# Patient Record
Sex: MALE | Race: WHITE | NOT HISPANIC OR LATINO | Employment: FULL TIME | URBAN - NONMETROPOLITAN AREA
[De-identification: names, ages, dates, MRNs, and addresses within clinical notes are randomized per-mention and may not be internally consistent; named-entity substitution may affect disease eponyms.]

---

## 2021-05-12 ENCOUNTER — APPOINTMENT (OUTPATIENT)
Dept: CT IMAGING | Facility: HOSPITAL | Age: 36
End: 2021-05-12

## 2021-05-12 ENCOUNTER — HOSPITAL ENCOUNTER (EMERGENCY)
Facility: HOSPITAL | Age: 36
Discharge: HOME | End: 2021-05-12
Attending: EMERGENCY MEDICINE | Admitting: EMERGENCY MEDICINE

## 2021-05-12 VITALS
SYSTOLIC BLOOD PRESSURE: 150 MMHG | OXYGEN SATURATION: 98 % | TEMPERATURE: 98 F | HEIGHT: 75 IN | DIASTOLIC BLOOD PRESSURE: 86 MMHG | WEIGHT: 212.7 LBS | RESPIRATION RATE: 16 BRPM | BODY MASS INDEX: 26.45 KG/M2 | HEART RATE: 97 BPM

## 2021-05-12 DIAGNOSIS — R10.9 ABDOMINAL PAIN, UNSPECIFIED ABDOMINAL LOCATION: ICD-10-CM

## 2021-05-12 DIAGNOSIS — K52.9 GASTROENTERITIS: Primary | ICD-10-CM

## 2021-05-12 LAB
ALBUMIN SERPL-MCNC: 4 G/DL (ref 3.5–5.2)
ALBUMIN/GLOB SERPL: 1.1 G/DL
ALP SERPL-CCNC: 197 U/L (ref 39–117)
ALT SERPL W P-5'-P-CCNC: 92 U/L (ref 1–41)
ANION GAP SERPL CALCULATED.3IONS-SCNC: 10 MMOL/L (ref 5–15)
AST SERPL-CCNC: 69 U/L (ref 1–40)
BASOPHILS # BLD AUTO: 0.06 10*3/MM3 (ref 0–0.2)
BASOPHILS NFR BLD AUTO: 0.7 % (ref 0–1.5)
BILIRUB SERPL-MCNC: 0.2 MG/DL (ref 0–1.2)
BILIRUB UR QL STRIP: NEGATIVE
BUN SERPL-MCNC: 9 MG/DL (ref 6–20)
BUN/CREAT SERPL: 10.3 (ref 7–25)
CALCIUM SPEC-SCNC: 9.6 MG/DL (ref 8.6–10.5)
CHLORIDE SERPL-SCNC: 102 MMOL/L (ref 98–107)
CLARITY UR: CLEAR
CO2 SERPL-SCNC: 29 MMOL/L (ref 22–29)
COLOR UR: ABNORMAL
CREAT SERPL-MCNC: 0.87 MG/DL (ref 0.76–1.27)
DEPRECATED RDW RBC AUTO: 36.4 FL (ref 37–54)
EOSINOPHIL # BLD AUTO: 0.05 10*3/MM3 (ref 0–0.4)
EOSINOPHIL NFR BLD AUTO: 0.6 % (ref 0.3–6.2)
ERYTHROCYTE [DISTWIDTH] IN BLOOD BY AUTOMATED COUNT: 11.3 % (ref 12.3–15.4)
GFR SERPL CREATININE-BSD FRML MDRD: 100 ML/MIN/1.73
GLOBULIN UR ELPH-MCNC: 3.6 GM/DL
GLUCOSE SERPL-MCNC: 103 MG/DL (ref 65–99)
GLUCOSE UR STRIP-MCNC: NEGATIVE MG/DL
HCT VFR BLD AUTO: 39.4 % (ref 37.5–51)
HGB BLD-MCNC: 14 G/DL (ref 13–17.7)
HGB UR QL STRIP.AUTO: NEGATIVE
HOLD SPECIMEN: NORMAL
HOLD SPECIMEN: NORMAL
IMM GRANULOCYTES # BLD AUTO: 0.08 10*3/MM3 (ref 0–0.05)
IMM GRANULOCYTES NFR BLD AUTO: 0.9 % (ref 0–0.5)
KETONES UR QL STRIP: ABNORMAL
LEUKOCYTE ESTERASE UR QL STRIP.AUTO: NEGATIVE
LIPASE SERPL-CCNC: 24 U/L (ref 13–60)
LYMPHOCYTES # BLD AUTO: 2.51 10*3/MM3 (ref 0.7–3.1)
LYMPHOCYTES NFR BLD AUTO: 28.7 % (ref 19.6–45.3)
MCH RBC QN AUTO: 31.5 PG (ref 26.6–33)
MCHC RBC AUTO-ENTMCNC: 35.5 G/DL (ref 31.5–35.7)
MCV RBC AUTO: 88.7 FL (ref 79–97)
MONOCYTES # BLD AUTO: 0.95 10*3/MM3 (ref 0.1–0.9)
MONOCYTES NFR BLD AUTO: 10.9 % (ref 5–12)
NEUTROPHILS NFR BLD AUTO: 5.1 10*3/MM3 (ref 1.7–7)
NEUTROPHILS NFR BLD AUTO: 58.2 % (ref 42.7–76)
NITRITE UR QL STRIP: NEGATIVE
NRBC BLD AUTO-RTO: 0 /100 WBC (ref 0–0.2)
PH UR STRIP.AUTO: 5.5 [PH] (ref 5–9)
PLAT MORPH BLD: NORMAL
PLATELET # BLD AUTO: 384 10*3/MM3 (ref 140–450)
PMV BLD AUTO: 9 FL (ref 6–12)
POTASSIUM SERPL-SCNC: 3.7 MMOL/L (ref 3.5–5.2)
PROT SERPL-MCNC: 7.6 G/DL (ref 6–8.5)
PROT UR QL STRIP: NEGATIVE
RBC # BLD AUTO: 4.44 10*6/MM3 (ref 4.14–5.8)
RBC MORPH BLD: NORMAL
SODIUM SERPL-SCNC: 141 MMOL/L (ref 136–145)
SP GR UR STRIP: 1.03 (ref 1–1.03)
TROPONIN T SERPL-MCNC: <0.01 NG/ML (ref 0–0.03)
UROBILINOGEN UR QL STRIP: ABNORMAL
WBC # BLD AUTO: 8.75 10*3/MM3 (ref 3.4–10.8)
WBC MORPH BLD: NORMAL
WHOLE BLOOD HOLD SPECIMEN: NORMAL
WHOLE BLOOD HOLD SPECIMEN: NORMAL

## 2021-05-12 PROCEDURE — 25010000002 ONDANSETRON PER 1 MG: Performed by: EMERGENCY MEDICINE

## 2021-05-12 PROCEDURE — 99283 EMERGENCY DEPT VISIT LOW MDM: CPT

## 2021-05-12 PROCEDURE — 81003 URINALYSIS AUTO W/O SCOPE: CPT | Performed by: EMERGENCY MEDICINE

## 2021-05-12 PROCEDURE — 84484 ASSAY OF TROPONIN QUANT: CPT | Performed by: EMERGENCY MEDICINE

## 2021-05-12 PROCEDURE — 25010000002 MORPHINE PER 10 MG: Performed by: EMERGENCY MEDICINE

## 2021-05-12 PROCEDURE — 96375 TX/PRO/DX INJ NEW DRUG ADDON: CPT

## 2021-05-12 PROCEDURE — 85007 BL SMEAR W/DIFF WBC COUNT: CPT | Performed by: EMERGENCY MEDICINE

## 2021-05-12 PROCEDURE — 80053 COMPREHEN METABOLIC PANEL: CPT | Performed by: EMERGENCY MEDICINE

## 2021-05-12 PROCEDURE — 83690 ASSAY OF LIPASE: CPT | Performed by: EMERGENCY MEDICINE

## 2021-05-12 PROCEDURE — 74177 CT ABD & PELVIS W/CONTRAST: CPT

## 2021-05-12 PROCEDURE — 85025 COMPLETE CBC W/AUTO DIFF WBC: CPT | Performed by: EMERGENCY MEDICINE

## 2021-05-12 PROCEDURE — 25010000002 IOPAMIDOL 61 % SOLUTION: Performed by: EMERGENCY MEDICINE

## 2021-05-12 PROCEDURE — 96374 THER/PROPH/DIAG INJ IV PUSH: CPT

## 2021-05-12 RX ORDER — DICYCLOMINE HCL 20 MG
20 TABLET ORAL EVERY 6 HOURS PRN
Qty: 10 TABLET | Refills: 0 | Status: SHIPPED | OUTPATIENT
Start: 2021-05-12

## 2021-05-12 RX ORDER — ONDANSETRON 2 MG/ML
4 INJECTION INTRAMUSCULAR; INTRAVENOUS ONCE
Status: COMPLETED | OUTPATIENT
Start: 2021-05-12 | End: 2021-05-12

## 2021-05-12 RX ORDER — SODIUM CHLORIDE 0.9 % (FLUSH) 0.9 %
10 SYRINGE (ML) INJECTION AS NEEDED
Status: DISCONTINUED | OUTPATIENT
Start: 2021-05-12 | End: 2021-05-12 | Stop reason: HOSPADM

## 2021-05-12 RX ORDER — DICYCLOMINE HCL 20 MG
20 TABLET ORAL EVERY 6 HOURS PRN
Qty: 10 TABLET | Refills: 0 | Status: SHIPPED | OUTPATIENT
Start: 2021-05-12 | End: 2021-05-12 | Stop reason: SDUPTHER

## 2021-05-12 RX ADMIN — ONDANSETRON HYDROCHLORIDE 4 MG: 2 INJECTION, SOLUTION INTRAMUSCULAR; INTRAVENOUS at 16:03

## 2021-05-12 RX ADMIN — MORPHINE SULFATE 4 MG: 4 INJECTION INTRAVENOUS at 16:03

## 2021-05-12 RX ADMIN — IOPAMIDOL 90 ML: 612 INJECTION, SOLUTION INTRAVENOUS at 16:34

## 2021-05-12 RX ADMIN — SODIUM CHLORIDE 1000 ML: 900 INJECTION, SOLUTION INTRAVENOUS at 16:02

## 2022-12-17 ENCOUNTER — HOSPITAL ENCOUNTER (INPATIENT)
Age: 37
LOS: 4 days | Discharge: HOME | DRG: 897 | End: 2022-12-22
Attending: EMERGENCY MEDICINE | Admitting: PSYCHIATRY & NEUROLOGY
Payer: COMMERCIAL

## 2022-12-17 ENCOUNTER — APPOINTMENT (OUTPATIENT)
Dept: CT IMAGING | Age: 37
DRG: 897 | End: 2022-12-17
Payer: COMMERCIAL

## 2022-12-17 DIAGNOSIS — F29 PSYCHOSIS, UNSPECIFIED PSYCHOSIS TYPE (HCC): ICD-10-CM

## 2022-12-17 DIAGNOSIS — R44.3 HALLUCINATIONS: Primary | ICD-10-CM

## 2022-12-17 LAB — SARS-COV-2, NAAT: NOT DETECTED

## 2022-12-17 PROCEDURE — 99285 EMERGENCY DEPT VISIT HI MDM: CPT

## 2022-12-17 PROCEDURE — 96372 THER/PROPH/DIAG INJ SC/IM: CPT

## 2022-12-17 PROCEDURE — 70450 CT HEAD/BRAIN W/O DYE: CPT

## 2022-12-17 ASSESSMENT — ENCOUNTER SYMPTOMS
VOMITING: 0
DIARRHEA: 0
SHORTNESS OF BREATH: 0
EYE PAIN: 0
ABDOMINAL PAIN: 0

## 2022-12-18 PROBLEM — R44.3 HALLUCINATIONS: Status: ACTIVE | Noted: 2022-12-18

## 2022-12-18 PROBLEM — F29 PSYCHOSIS, UNSPECIFIED PSYCHOSIS TYPE (HCC): Status: ACTIVE | Noted: 2022-12-18

## 2022-12-18 LAB
ACETAMINOPHEN LEVEL: <5 UG/ML (ref 10–30)
ALBUMIN SERPL-MCNC: 4.5 G/DL (ref 3.5–5.2)
ALP BLD-CCNC: 77 U/L (ref 40–130)
ALT SERPL-CCNC: 13 U/L (ref 5–41)
AMPHETAMINE SCREEN, URINE: NEGATIVE
ANION GAP SERPL CALCULATED.3IONS-SCNC: 12 MMOL/L (ref 7–19)
AST SERPL-CCNC: 18 U/L (ref 5–40)
BARBITURATE SCREEN URINE: NEGATIVE
BENZODIAZEPINE SCREEN, URINE: NEGATIVE
BILIRUB SERPL-MCNC: 0.4 MG/DL (ref 0.2–1.2)
BILIRUBIN URINE: NEGATIVE
BLOOD, URINE: NEGATIVE
BUN BLDV-MCNC: 12 MG/DL (ref 6–20)
BUPRENORPHINE URINE: NEGATIVE
CALCIUM SERPL-MCNC: 9.4 MG/DL (ref 8.6–10)
CANNABINOID SCREEN URINE: NEGATIVE
CHLORIDE BLD-SCNC: 100 MMOL/L (ref 98–111)
CLARITY: CLEAR
CO2: 25 MMOL/L (ref 22–29)
COCAINE METABOLITE SCREEN URINE: NEGATIVE
COLOR: YELLOW
CREAT SERPL-MCNC: 1.1 MG/DL (ref 0.5–1.2)
ETHANOL: <10 MG/DL (ref 0–0.08)
GFR SERPL CREATININE-BSD FRML MDRD: >60 ML/MIN/{1.73_M2}
GLUCOSE BLD-MCNC: 104 MG/DL (ref 74–109)
GLUCOSE URINE: NEGATIVE MG/DL
HCT VFR BLD CALC: 44 % (ref 42–52)
HEMOGLOBIN: 14.5 G/DL (ref 14–18)
KETONES, URINE: NEGATIVE MG/DL
LEUKOCYTE ESTERASE, URINE: NEGATIVE
Lab: NORMAL
MCH RBC QN AUTO: 31.9 PG (ref 27–31)
MCHC RBC AUTO-ENTMCNC: 33 G/DL (ref 33–37)
MCV RBC AUTO: 96.9 FL (ref 80–94)
METHADONE SCREEN, URINE: NEGATIVE
METHAMPHETAMINE, URINE: NEGATIVE
NITRITE, URINE: NEGATIVE
OPIATE SCREEN URINE: NEGATIVE
OXYCODONE URINE: NEGATIVE
PDW BLD-RTO: 13.2 % (ref 11.5–14.5)
PH UA: 7.5 (ref 5–8)
PHENCYCLIDINE SCREEN URINE: NEGATIVE
PLATELET # BLD: 346 K/UL (ref 130–400)
PMV BLD AUTO: 9.4 FL (ref 9.4–12.4)
POTASSIUM SERPL-SCNC: 4.3 MMOL/L (ref 3.5–5)
PROPOXYPHENE SCREEN: NEGATIVE
PROTEIN UA: NEGATIVE MG/DL
RBC # BLD: 4.54 M/UL (ref 4.7–6.1)
SALICYLATE, SERUM: <0.3 MG/DL (ref 3–10)
SODIUM BLD-SCNC: 137 MMOL/L (ref 136–145)
SPECIFIC GRAVITY UA: 1.01 (ref 1–1.03)
TOTAL PROTEIN: 7.2 G/DL (ref 6.6–8.7)
TRICYCLIC, URINE: NEGATIVE
UROBILINOGEN, URINE: 0.2 E.U./DL
WBC # BLD: 7.3 K/UL (ref 4.8–10.8)

## 2022-12-18 PROCEDURE — 81003 URINALYSIS AUTO W/O SCOPE: CPT

## 2022-12-18 PROCEDURE — 80306 DRUG TEST PRSMV INSTRMNT: CPT

## 2022-12-18 PROCEDURE — 1240000000 HC EMOTIONAL WELLNESS R&B

## 2022-12-18 PROCEDURE — 80053 COMPREHEN METABOLIC PANEL: CPT

## 2022-12-18 PROCEDURE — 6360000002 HC RX W HCPCS: Performed by: EMERGENCY MEDICINE

## 2022-12-18 PROCEDURE — 87635 SARS-COV-2 COVID-19 AMP PRB: CPT

## 2022-12-18 PROCEDURE — 80179 DRUG ASSAY SALICYLATE: CPT

## 2022-12-18 PROCEDURE — 80143 DRUG ASSAY ACETAMINOPHEN: CPT

## 2022-12-18 PROCEDURE — 36415 COLL VENOUS BLD VENIPUNCTURE: CPT

## 2022-12-18 PROCEDURE — 6370000000 HC RX 637 (ALT 250 FOR IP): Performed by: PSYCHIATRY & NEUROLOGY

## 2022-12-18 PROCEDURE — 85027 COMPLETE CBC AUTOMATED: CPT

## 2022-12-18 PROCEDURE — 82077 ASSAY SPEC XCP UR&BREATH IA: CPT

## 2022-12-18 RX ORDER — POLYETHYLENE GLYCOL 3350 17 G/17G
17 POWDER, FOR SOLUTION ORAL DAILY PRN
Status: DISCONTINUED | OUTPATIENT
Start: 2022-12-18 | End: 2022-12-22 | Stop reason: HOSPADM

## 2022-12-18 RX ORDER — RISPERIDONE 1 MG/1
1 TABLET ORAL NIGHTLY
Status: DISCONTINUED | OUTPATIENT
Start: 2022-12-18 | End: 2022-12-19

## 2022-12-18 RX ORDER — HYDROXYZINE HYDROCHLORIDE 25 MG/1
25 TABLET, FILM COATED ORAL 3 TIMES DAILY PRN
Status: DISCONTINUED | OUTPATIENT
Start: 2022-12-18 | End: 2022-12-22 | Stop reason: HOSPADM

## 2022-12-18 RX ORDER — LORAZEPAM 2 MG/ML
2 INJECTION INTRAMUSCULAR ONCE
Status: COMPLETED | OUTPATIENT
Start: 2022-12-18 | End: 2022-12-18

## 2022-12-18 RX ORDER — ACETAMINOPHEN 325 MG/1
650 TABLET ORAL EVERY 4 HOURS PRN
Status: DISCONTINUED | OUTPATIENT
Start: 2022-12-18 | End: 2022-12-22 | Stop reason: HOSPADM

## 2022-12-18 RX ORDER — TRAZODONE HYDROCHLORIDE 50 MG/1
50 TABLET ORAL NIGHTLY
Status: DISCONTINUED | OUTPATIENT
Start: 2022-12-18 | End: 2022-12-22 | Stop reason: HOSPADM

## 2022-12-18 RX ORDER — HALOPERIDOL 5 MG/ML
5 INJECTION INTRAMUSCULAR ONCE
Status: COMPLETED | OUTPATIENT
Start: 2022-12-18 | End: 2022-12-18

## 2022-12-18 RX ORDER — MECOBALAMIN 5000 MCG
5 TABLET,DISINTEGRATING ORAL NIGHTLY
Status: DISCONTINUED | OUTPATIENT
Start: 2022-12-18 | End: 2022-12-22 | Stop reason: HOSPADM

## 2022-12-18 RX ADMIN — LORAZEPAM 2 MG: 2 INJECTION INTRAMUSCULAR; INTRAVENOUS at 02:57

## 2022-12-18 RX ADMIN — TRAZODONE HYDROCHLORIDE 50 MG: 50 TABLET ORAL at 20:52

## 2022-12-18 RX ADMIN — HALOPERIDOL LACTATE 5 MG: 5 INJECTION, SOLUTION INTRAMUSCULAR at 02:57

## 2022-12-18 RX ADMIN — RISPERIDONE 1 MG: 1 TABLET ORAL at 20:52

## 2022-12-18 RX ADMIN — Medication 5 MG: at 20:52

## 2022-12-18 ASSESSMENT — SLEEP AND FATIGUE QUESTIONNAIRES
DO YOU HAVE DIFFICULTY SLEEPING: YES
DO YOU USE A SLEEP AID: NO
AVERAGE NUMBER OF SLEEP HOURS: 4

## 2022-12-18 ASSESSMENT — ENCOUNTER SYMPTOMS
GASTROINTESTINAL NEGATIVE: 1
RESPIRATORY NEGATIVE: 1

## 2022-12-18 NOTE — H&P
(TYLENOL) tablet 650 mg, 650 mg, Oral, Q4H PRN, Jonnathan Ferreira MD    polyethylene glycol (GLYCOLAX) packet 17 g, 17 g, Oral, Daily PRN, Jonnathan Ferreira MD    traZODone (DESYREL) tablet 50 mg, 50 mg, Oral, Nightly, Jonnathan Ferreira MD    melatonin disintegrating tablet 5 mg, 5 mg, Oral, Nightly, Jonnathan Ferreira MD    hydrOXYzine HCl (ATARAX) tablet 25 mg, 25 mg, Oral, TID PRN, Jonnathan Ferreira MD    risperiDONE (RISPERDAL) tablet 1 mg, 1 mg, Oral, Nightly, Jonnathan Ferreira MD    nicotine polacrilex (NICORETTE) gum 2 mg, 2 mg, Oral, Q2H PRN, Jonnathan Ferreira MD    Patient has no known allergies. REVIEW OF SYSTEMS:    Review of Systems   Constitutional: Negative. HENT: Negative. Respiratory: Negative. Cardiovascular: Negative. Gastrointestinal: Negative. Genitourinary: Negative. Musculoskeletal: Negative. Skin: Negative. Neurological: Negative. Psychiatric/Behavioral:  Positive for hallucinations. PHYSICAL EXAM:    /80   Pulse (!) 101   Temp 97.3 °F (36.3 °C)   Resp 16   Ht 6' 3\" (1.905 m)   Wt 193 lb 8 oz (87.8 kg)   SpO2 97%   BMI 24.19 kg/m²     Physical Exam  Vitals reviewed. Constitutional:       Appearance: Normal appearance. HENT:      Head: Normocephalic and atraumatic. Mouth/Throat:      Mouth: Mucous membranes are moist.      Pharynx: Oropharynx is clear. Eyes:      Extraocular Movements: Extraocular movements intact. Pupils: Pupils are equal, round, and reactive to light. Cardiovascular:      Rate and Rhythm: Normal rate and regular rhythm. Pulses: Normal pulses. Heart sounds: Normal heart sounds. Pulmonary:      Effort: Pulmonary effort is normal. No respiratory distress. Breath sounds: Normal breath sounds. Abdominal:      General: Abdomen is flat. Bowel sounds are normal.      Palpations: Abdomen is soft. Musculoskeletal:         General: Normal range of motion. Cervical back: Normal range of motion and neck supple. Skin:     General: Skin is warm and dry. Capillary Refill: Capillary refill takes less than 2 seconds. Neurological:      General: No focal deficit present. Mental Status: He is alert and oriented to person, place, and time. Cranial Nerves: No cranial nerve deficit. Psychiatric:         Mood and Affect: Mood normal.         Behavior: Behavior normal.         Thought Content:  Thought content normal.         Judgment: Judgment normal.       Recent Results (from the past 24 hour(s))   COVID-19, Rapid    Collection Time: 12/17/22 11:08 PM    Specimen: Nasopharyngeal Swab   Result Value Ref Range    SARS-CoV-2, NAAT Not Detected Not Detected   Acetaminophen Level    Collection Time: 12/17/22 11:52 PM   Result Value Ref Range    Acetaminophen Level <5 (L) 10 - 30 ug/mL   CBC    Collection Time: 12/17/22 11:52 PM   Result Value Ref Range    WBC 7.3 4.8 - 10.8 K/uL    RBC 4.54 (L) 4.70 - 6.10 M/uL    Hemoglobin 14.5 14.0 - 18.0 g/dL    Hematocrit 44.0 42.0 - 52.0 %    MCV 96.9 (H) 80.0 - 94.0 fL    MCH 31.9 (H) 27.0 - 31.0 pg    MCHC 33.0 33.0 - 37.0 g/dL    RDW 13.2 11.5 - 14.5 %    Platelets 550 674 - 836 K/uL    MPV 9.4 9.4 - 12.4 fL   Comprehensive Metabolic Panel    Collection Time: 12/17/22 11:52 PM   Result Value Ref Range    Sodium 137 136 - 145 mmol/L    Potassium 4.3 3.5 - 5.0 mmol/L    Chloride 100 98 - 111 mmol/L    CO2 25 22 - 29 mmol/L    Anion Gap 12 7 - 19 mmol/L    Glucose 104 74 - 109 mg/dL    BUN 12 6 - 20 mg/dL    Creatinine 1.1 0.5 - 1.2 mg/dL    Est, Glom Filt Rate >60 >60    Calcium 9.4 8.6 - 10.0 mg/dL    Total Protein 7.2 6.6 - 8.7 g/dL    Albumin 4.5 3.5 - 5.2 g/dL    Total Bilirubin 0.4 0.2 - 1.2 mg/dL    Alkaline Phosphatase 77 40 - 130 U/L    ALT 13 5 - 41 U/L    AST 18 5 - 40 U/L   Ethanol    Collection Time: 12/17/22 11:52 PM   Result Value Ref Range    Ethanol Lvl <41 mg/dL   Salicylate    Collection Time: 12/17/22 11:52 PM   Result Value Ref Range    Salicylate, Serum <0.3 (L) 3.0 - 10.0 mg/dL   Drug SCRN, Buprenorphine    Collection Time: 12/18/22 12:34 AM   Result Value Ref Range    Amphetamine Screen, Urine Negative Negative <500 ng/mL    Barbiturate Screen, Ur Negative Negative < 200 ng/mL    Benzodiazepine Screen, Urine Negative Negative <150 ng/mL    Cannabinoid Scrn, Ur Negative Negative <50 ng/mL    Cocaine Metabolite Screen, Urine Negative Negative <150 ng/mL    Opiate Scrn, Ur Negative Negative < 100 ng/mL    PCP Screen, Urine Negative Negative <25 ng/mL    Methadone Screen, Urine Negative Negative <200 ng/mL    Propoxyphene Scrn, Ur Negative Negative <266 ng/mL    Tricyclic Negative Negative <300 ng/mL    Oxycodone Urine Negative Negative <100 ng/mL    Buprenorphine Urine Negative Negative <10 ng/mL    Methamphetamine, Urine Negative Negative <500 ng/mL    Drug Screen Comment: see below    Urinalysis with Reflex to Culture    Collection Time: 12/18/22 12:34 AM    Specimen: Urine   Result Value Ref Range    Color, UA YELLOW Straw/Yellow    Clarity, UA Clear Clear    Glucose, Ur Negative Negative mg/dL    Bilirubin Urine Negative Negative    Ketones, Urine Negative Negative mg/dL    Specific Gravity, UA 1.013 1.005 - 1.030    Blood, Urine Negative Negative    pH, UA 7.5 5.0 - 8.0    Protein, UA Negative Negative mg/dL    Urobilinogen, Urine 0.2 <2.0 E.U./dL    Nitrite, Urine Negative Negative    Leukocyte Esterase, Urine Negative Negative         ASSESSMENT:  Hallucinations  Psychosis    PLAN:    He is admitted to [facility]. Lab results reviewed. No home meds listed. VSS. Continue plan of care per psych. Alter treatment plan as needed.         Alexys Mathew, APRN,   12/18/2022

## 2022-12-18 NOTE — PROGRESS NOTES
Group Note    Date: 12/18/22  Start Time: 9:05 AM   End Time:9:45 AM     Number of Participants: 11    Type of Group: Community/Goal     Patient's Goal:      Notes:  Did not participate    Status After Intervention:      Participation Level:     Participation Quality:     Speech:       Thought Process/Content:     Mood:     Level of consciousness:      Response to Learning:     Modes of Intervention: Education and Support    Discipline Responsible: Behavioral Health Technician     Signature:  Edgardo Hinton

## 2022-12-18 NOTE — PROGRESS NOTES
Admission Note      Reason for admission/Target Symptom: Per nursing admission assessment - Reason for Admission: Richa Garcia is a 30 y.o. male who presents to the emergency department for a mental health evaluation. Patient has been seeing ghosts for 2 weeks. Says that sometimes he will see something on the corner of his eyes like a dark shadow. Sometimes he sees stuff on the bed that makes the sheets move. No active hallucinations at this time. Felt like a ghost bit him on his right ear and right arm earlier. He has no signs of trauma. No HI or SI. He has no complaints at this time. Family did say that his wife recently had an EPO put out against him because of strange behavior. Family said that wife told him that she has been tracking him with his phone and that he has not really slept in the last 4 days and has been going to multiple gas stations. She is concerned that he may be using kratom. Family says that wife states that she was with him when he bought it but family thinks she may be unreliable. Does have a history of meth use in October of this year. Also has a history of alcohol use. Patient denies any meth use since October. Denies any ingestion or drug use of any kind. Patient has no inpatient or outpatient psychiatric treatment and no history of suicide attempts. Diagnoses: Psychosis, unspecified; Methamphetamine Use Disorder; Alcohol Use Disorder    UDS: Negative   BAL: Negative <10    SW will meet with treatment team to discuss patient's treatment including care planning, discharge planning, and follow-up needs. Patient has been admitted to [facility]. Treatment team will identify the patient's discharge needs. Appointments will be made for medication management and outpatient therapy/counseling. Pt confirmed the need for ongoing treatment post inpatient stay.  Pt was also provided a handout of contact information for drug and alcohol treatment centers and other community support service such as [facility], [facility], and [facility].

## 2022-12-18 NOTE — PLAN OF CARE
Problem: Self Harm/Suicidality  Goal: Will have no self-injury during hospital stay  Description: INTERVENTIONS:  1. Ensure constant observer at bedside with Q15M safety checks  2. Maintain a safe environment  3. Secure patient belongings  4. Ensure family/visitors adhere to safety recommendations  5. Ensure safety tray has been added to patient's diet order  6.   Every shift and PRN: Re-assess suicidal risk via Frequent Screener    Outcome: Progressing  Flowsheets (Taken 12/18/2022 1419)  Will have no self-injury during hospital stay: Maintain a safe environment

## 2022-12-18 NOTE — ED PROVIDER NOTES
[facility] EMERGENCY DEPT  eMERGENCY dEPARTMENT eNCOUnter      Pt Name: Abby Gomez  MRN: [id number]  [place] 1985  Date of evaluation: 12/17/2022  Provider: Abby Mora MD    CHIEF COMPLAINT       Chief Complaint   Patient presents with    Mental Health Problem     Pt states that he has been \"seeing ghosts\"- states that one of them bit him today- states has been seeing them for about a week         HISTORY OF PRESENT ILLNESS   (Location/Symptom, Timing/Onset,Context/Setting, Quality, Duration, Modifying Factors, Severity)  Note limiting factors. Abby Gomez is a 30 y.o. male who presents to the emergency department relation. Patient has been seeing ghosts for over 2 weeks now. Says that sometimes he will see something on the corner of his eyes like a dark shadow. Sometimes he sees stuff on the bed that makes the sheets moved. No active hallucinations at this time. Felt like a ghost bit him on his right ear and right arm earlier. He has no signs of trauma. No HI or SI. He has no complaints at this time. Accompanied by family. Family did tell me that his wife recently had an EPO put out against him because of strange behavior. Family said that wife told him that she has been tracking him with his phone and that he has not really slept in the last 4 days has been going to multiple gas stations. She is concerned that he may be using kratom. Does have a history of meth use in October of this year. Also has a history of alcohol use. Patient denies any meth use since October. Denies any ingestion or drug use of any kind. Currently, patient resting comfortably without complaints. HPI    NursingNotes were reviewed. REVIEW OF SYSTEMS    (2-9 systems for level 4, 10 or more for level 5)     Review of Systems   Constitutional:  Negative for fever. Eyes:  Negative for pain. Respiratory:  Negative for shortness of breath. Cardiovascular:  Negative for chest pain and palpitations. Gastrointestinal:  Negative for abdominal pain, diarrhea and vomiting. Genitourinary:  Negative for dysuria. Skin:  Negative for rash. Neurological:  Negative for weakness and headaches. Psychiatric/Behavioral:  Positive for hallucinations. Negative for suicidal ideas. All other systems reviewed and are negative. A complete review of systems was performed and is negative except as noted above in the HPI. PAST MEDICAL HISTORY   History reviewed. No pertinent past medical history. SURGICAL HISTORY     History reviewed. No pertinent surgical history. CURRENT MEDICATIONS       Previous Medications    No medications on file       ALLERGIES     Patient has no known allergies. FAMILY HISTORY     History reviewed. No pertinent family history. SOCIAL HISTORY       Social History     Socioeconomic History    Marital status:      Spouse name: None    Number of children: None    Years of education: None    Highest education level: None   Tobacco Use    Smoking status: Every Day     Packs/day: 0.50     Types: Cigarettes   Substance and Sexual Activity    Alcohol use: Yes     Comment: 2 x weekly    Drug use: Not Currently     Types: Methamphetamines (Crystal Meth)       SCREENINGS    Oden Coma Scale  Eye Opening: Spontaneous  Best Verbal Response: Oriented  Best Motor Response: Obeys commands  Oden Coma Scale Score: 15        PHYSICAL EXAM    (up to 7 for level 4, 8 or more for level 5)     ED Triage Vitals [12/17/22 2254]   BP Temp Temp src Heart Rate Resp SpO2 Height Weight   (!) 146/106 97.8 °F (36.6 °C) -- (!) 110 17 95 % 6' 3\" (1.905 m) 210 lb (95.3 kg)       Physical Exam  Vitals reviewed. Constitutional:       General: He is not in acute distress. Appearance: He is well-developed. HENT:      Head: Normocephalic and atraumatic.       Ears:      Comments: Bilateral cerumen impaction     Mouth/Throat:      Mouth: Mucous membranes are moist.   Eyes:      General: No scleral icterus. Extraocular Movements: Extraocular movements intact. Conjunctiva/sclera: Conjunctivae normal.      Pupils: Pupils are equal, round, and reactive to light. Neck:      Vascular: No JVD. Cardiovascular:      Rate and Rhythm: Normal rate and regular rhythm. Pulses: Normal pulses. Heart sounds: Normal heart sounds. Pulmonary:      Effort: Pulmonary effort is normal. No respiratory distress. Breath sounds: Normal breath sounds. Abdominal:      General: There is no distension. Palpations: Abdomen is soft. Tenderness: There is no abdominal tenderness. There is no guarding or rebound. Musculoskeletal:         General: No tenderness. Normal range of motion. Cervical back: Normal range of motion and neck supple. No rigidity. Lymphadenopathy:      Cervical: No cervical adenopathy. Skin:     General: Skin is warm and dry. Capillary Refill: Capillary refill takes less than 2 seconds. Neurological:      General: No focal deficit present. Mental Status: He is alert and oriented to person, place, and time. GCS: GCS eye subscore is 4. GCS verbal subscore is 5. GCS motor subscore is 6. Cranial Nerves: Cranial nerves 2-12 are intact. Sensory: Sensation is intact. Motor: Motor function is intact. Psychiatric:         Mood and Affect: Mood and affect normal.         Speech: Speech normal.         Behavior: Behavior normal.         Thought Content:  Thought content normal.       DIAGNOSTIC RESULTS     EKG: All EKG's are interpreted by the Emergency Department Physician who either signs or Co-signs this chart in the absence of a cardiologist.        RADIOLOGY:   Non-plain film images such as CT, Ultrasound and MRI are read by the radiologist. Plainradiographic images are visualized and preliminarily interpreted by the emergency physician with the below findings:        Interpretation per the Radiologist below, if available at the time of this note:    CT HEAD WO CONTRAST   Final Result   No acute hemorrhage, hydrocephalus, or mass effect. Electronically signed by Stacey Brizuela MD on 12-18-22 at 3552            ED BEDSIDE ULTRASOUND:   Performed by ED Physician - none    LABS:  Labs Reviewed   ACETAMINOPHEN LEVEL - Abnormal; Notable for the following components:       Result Value    Acetaminophen Level <5 (*)     All other components within normal limits   CBC - Abnormal; Notable for the following components:    RBC 4.54 (*)     MCV 96.9 (*)     MCH 31.9 (*)     All other components within normal limits   SALICYLATE LEVEL - Abnormal; Notable for the following components:    Salicylate, Serum <8.3 (*)     All other components within normal limits   COVID-19, RAPID   COMPREHENSIVE METABOLIC PANEL   ETHANOL   DRUG SCRN, BUPRENORPHINE   URINALYSIS WITH REFLEX TO CULTURE       All other labs were within normal range or not returned as of this dictation. EMERGENCY DEPARTMENT COURSE and DIFFERENTIALDIAGNOSIS/MDM:   Vitals:    Vitals:    12/18/22 0032 12/18/22 0102 12/18/22 0132 12/18/22 0155   BP: (!) 139/91 125/73 124/69    Pulse:       Resp:       Temp:       SpO2: 95% 93%  97%   Weight:       Height:           MDM    Patient was seen by intake with psychiatry and discussed with on-call psychiatrist, Dr. Hannah Vyas, who will admit. Patient has been placed on a 72-hour hold. Haldol and Ativan ordered per Dr. April Recinos instructions. CONSULTS:  IP CONSULT TO PSYCHIATRY    PROCEDURES:  Unless otherwise notedbelow, none     Procedures    FINAL IMPRESSION     1. Hallucinations    2.  Psychosis, unspecified psychosis type [facility])          DISPOSITION/PLAN   DISPOSITION Decision To Admit 12/18/2022 02:41:45 AM      PATIENT REFERRED TO:  @FUP@    DISCHARGE MEDICATIONS:  New Prescriptions    No medications on file          (Please note that portions of this note were completed with a voice recognition program.  Efforts were made to edit the dictations butoccasionally words are mis-transcribed.)    Luz Nunez MD (electronically signed)  AttendingEmergency Physician          Luz Nunez MD  12/18/22 4676
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
## 2022-12-18 NOTE — H&P
Department of Psychiatry  Attending History and Physical - Adult         CHIEF COMPLAINT: Psychosis    History obtained from:  patient    HISTORY OF PRESENT ILLNESS:          The patient is a 30 y.o. male with no reported previous psychiatric history, who has been admitted to our psychiatric unit secondary to visual hallucinations. Patient has been seen in treatment team room. He reported that during the last 1-1/2-week he sees ghosts \"only when I go inside of my house\". He reported long history of abusing alcohol, using methamphetamine, opioids and marijuana. Patient reported that he went to rehab in September - October 2022 and since that time he significantly decreased amount of drinking alcohol and using methamphetamine. He reported that last time when he used opioids (kratom) is in September 2022 and last time he smoked marijuana 1 year ago. (For additional information, see patient's social history). Patient reported that experiencing visual hallucinations makes him feel stressed and he decided to come to the hospital and ask for help. Today during the interview, he denies feeling of depression or anxiety, endorses decreased quality of sleep, low energy level, feeling of fatigue, tiredness, and general muscle weakness. Patient denies any mood swings or racing thoughts. He denies feeling of hopelessness, helplessness and worthlessness. Patient denies current active suicidal or homicidal ideations, denies any plans. Also, he denies auditory and visual hallucinations today, stated that last time when he experienced visual hallucinations is yesterday in the morning. He did not endorse any delusions or paranoid thoughts. PSYCHIATRIC HISTORY:    Diagnoses: Denies  Suicide attempts/gestures: Denies   Prior hospitalizations: Denies   Medication trials: Denies   Mental health contact: Denies  Head trauma: Denies     Past Medical History:    History reviewed.  No pertinent past medical history. Past Surgical History:    History reviewed. No pertinent surgical history. Medications Prior to Admission:   No medications prior to admission. Allergies:  Patient has no known allergies. Social History:  Patient lives with his wife and 1 his child. Tobacco-1 PPD and vapes nicotine cartridges  Alcohol-reported history of heavy drinking alcohol, stated that he was drinking at least 6 - 8 packs of the beer daily for 10-15 years. Patient reported that he decreased the amount of drinking alcohol after recent rehab treatment and currently drinks alcohol only 6 packs a week. Illicit substances, patient reported that he smoked marijuana in the past daily and last smoked marijuana 1 year ago. Methamphetamine - has been using \"for a month almost every day\" in August - September. Patient reported that after he finished rehab treatment in October he used methamphetamine twice during the last 2 months, last time 2 weeks ago. Kratom-reported that he used kratom for insomnia, last time used kratom in September 2022. The patient reported history of admission to rehab facility for alcohol or drug disorder in September - October 2022. Family History:   History reviewed. No pertinent family history. Psychiatric Family History  No family history    REVIEW OF SYSTEMS:  General: Endorses decreased quality of sleep. No fevers, chills, night sweats, no recent weight loss or gain. Head: No headache, no migraine. Eyes: No recent visual changes. Ears: No recent hearing changes. Nose: No increased congestion or change in sense of smell. Throat: No sore throat, no trouble swallowing. Cardiovascular: No chest pain or palpitations, or dizziness. Respiratory: No cough, wheezes, congestion, or shortness of breath. Gastrointestinal: No abdominal pain, nausea or vomiting, no diarrhea or constipation. Musculo-skeletal: No edema, deformities, or loss of functions.   Neurological: No loss of consciousness, abnormal sensations, or weakness. Skin: No rash, abrasions or bruises. PHYSICAL EXAM:    Vitals:  /85   Pulse (!) 112   Temp 97 °F (36.1 °C) (Temporal)   Resp 20   Ht 6' 3\" (1.905 m)   Wt 193 lb 8 oz (87.8 kg)   SpO2 97%   BMI 24.19 kg/m²     Mental Status Examination:   Appearance: Appropriately groomed and in hospital attire. Made intermittent eye contact. Behavior: Slightly withdrawn, cooperative with interview. Mild psychomotor retardation appreciated. Gait unremarkable. Speech: Normal in tone, volume, and quality. Mood: \"Very tired\"   Affect: Mood congruent. Range is flat and restricted. Thought Process: Mostly circumstantial, sometimes linear and goal oriented. Thought Content: Patient does not have any current active suicidal and homicidal ideations. No overt delusions or paranoia appreciated. Perceptions: Seems patient does not have any auditory or visual hallucinations at present time. Patient did not appear to be responding to internal stimuli. No overt psychosis. Executive Functions: Appear mildly impaired. Concentration: Decreased. Reasoning: Appears impaired based on interaction from interview   Orientation: to person, place, date, and situation. Alert. Language: Intact. Fund of information: Intact. Memory: recent and remote appear intact. Impulsivity: Limited  Neurovegitative: Fair appetite, decreased sleep  Insight: Limited  Judgment: Limited       Physical Exam:  GENERAL APPEARANCE: 30y.o. year-old male in NAD   HEAD: Normocephalic, atraumatic. THROAT: No erythema, exudates, lesions. No tongue laceration. CARDIOVASCULAR: PMI nondisplaced. Regular rhythm and rate. Normal S1 and S2.  PULMONARY: Clear to auscultation bilaterally, no tenderness to palpation. ABDOMEN: Soft, nontender, nondistended.    MUSCULOSKELTAL: No obvious deformities, clubbing, cyanosis or edema, no spinous process or paraspinous tenderness, normal ROM, normal gait, distal pulses intact symmetric 2+ bilaterally. NEUROLOGICAL: Alert, oriented x 4, CN II-XII grossly intact, motor strength 4/5 all muscle groups, DTR 1+ intact and symmetric, sensation intact to sharp and dull. No abnormal movements or tremors. SKIN: Warm, dry, intact, no rash, abrasions or bruises     DATA:  Labs:  CBC with Differential:    Lab Results   Component Value Date/Time    WBC 7.3 12/17/2022 11:52 PM    RBC 4.54 12/17/2022 11:52 PM    HGB 14.5 12/17/2022 11:52 PM    HCT 44.0 12/17/2022 11:52 PM     12/17/2022 11:52 PM    MCV 96.9 12/17/2022 11:52 PM    MCH 31.9 12/17/2022 11:52 PM    MCHC 33.0 12/17/2022 11:52 PM    RDW 13.2 12/17/2022 11:52 PM     CMP:    Lab Results   Component Value Date/Time     12/17/2022 11:52 PM    K 4.3 12/17/2022 11:52 PM     12/17/2022 11:52 PM    CO2 25 12/17/2022 11:52 PM    BUN 12 12/17/2022 11:52 PM    CREATININE 1.1 12/17/2022 11:52 PM    LABGLOM >60 12/17/2022 11:52 PM    GLUCOSE 104 12/17/2022 11:52 PM    PROT 7.2 12/17/2022 11:52 PM    LABALBU 4.5 12/17/2022 11:52 PM    CALCIUM 9.4 12/17/2022 11:52 PM    BILITOT 0.4 12/17/2022 11:52 PM    ALKPHOS 77 12/17/2022 11:52 PM    AST 18 12/17/2022 11:52 PM    ALT 13 12/17/2022 11:52 PM       DSM 5 DIAGNOSIS:  Psychosis, unspecified  Methamphetamine use disorder, severe  Opioids (kratom) use disorder, severe  Cannabis use disorder, severe  Alcohol use disorder, severe  Tobacco use disorder, severe  Rule out drug-induced psychosis  Insomnia    Plan:   -Admit to [facility] adult Unit and monitor on 15 minute checks  Latrell Bolivar reviewed. -Gather collateral information from family with release  -Medical monitoring to be performed by Dr. Omi Castillo and associates  -Acclimate to the unit. -Encourage participation in groups and therapeutic activities as appropriate.  -Medications:     Will start patient on Risperdal 1 mg at bedtime for psychosis  Trazodone 50 mg and melatonin 5 mg at bedtime for insomnia  Nicotine gum 2 mg every 2 hours as needed for smoking cessation    -The risks, benefits, side effects, indications, contraindications, and adverse effects of the medications have been discussed.  -The patient has verbalized understanding and has capacity to give informed consent.  -SW help evaluating home environment.   -Discuss with treatment team.

## 2022-12-18 NOTE — PROGRESS NOTES
[facility] Admission Note  Nursing Admission Note        Reason for Admission: Brent Kelsey is a 30 y.o. male who presents to the emergency department for a mental health evaluation. Patient has been seeing ghosts for 2 weeks. Says that sometimes he will see something on the corner of his eyes like a dark shadow. Sometimes he sees stuff on the bed that makes the sheets move. No active hallucinations at this time. Felt like a ghost bit him on his right ear and right arm earlier. He has no signs of trauma. No HI or SI. He has no complaints at this time. Family did say that his wife recently had an EPO put out against him because of strange behavior. Family said that wife told him that she has been tracking him with his phone and that he has not really slept in the last 4 days and has been going to multiple gas stations. She is concerned that he may be using kratom. Family says that wife states that she was with him when he bought it but family thinks she may be unreliable. Does have a history of meth use in October of this year. Also has a history of alcohol use. Patient denies any meth use since October. Denies any ingestion or drug use of any kind. Patient has no inpatient or outpatient psychiatric treatment and no history of suicide attempts. Patient Active Problem List   Diagnosis    Psychosis, unspecified psychosis type ([address]         Addictive Behavior:    none    Medical Problems:   History reviewed. No pertinent past medical history.     Status EXAM:  Mental Status and Behavioral Exam  Normal: No  Level of Assistance: Independent/Self  Facial Expression: Avoids Gaze  Affect: Congruent  Level of Consciousness: Alert  Frequency of Checks: 4 times per hour, close  Mood:Normal: No  Mood: Depressed  Motor Activity:Normal: No  Motor Activity: Decreased, Agitated, Tics  Eye Contact: Poor  Observed Behavior: Cooperative  Sexual Misconduct History: Current - no  Preception: [place] to time, [place] to person, Niesha Caper to place, [place] to situation  Attention:Normal: Yes  Thought Processes: Tangential  Thought Content:Normal: No  Thought Content: Delusions  Depression Symptoms: Sleep disturbance, Isolative, Increased irritability, Impaired concentration  Anxiety Symptoms: Generalized, Panic attack, Compulsive, Obsessions, Ritualistic behavior  Jennie Symptoms: No problems reported or observed. Hallucinations: Auditory (comment), Visual (comment) (Pt reports seeing ghost and hearing high squeaking noises)  Delusions: Yes  Delusions: Controlled, Obsessions  Memory:Normal: No  Memory: Poor recent, Poor remote  Insight and Judgment: No  Insight and Judgment: Poor judgment, Poor insight, Unrealistic    Psych:   Suicidal Ideation: no.  If yes, is there a plan? (Describe) NA              Risk of Suicide: No Risk   Homicidal Ideation:  no.  If yes, describe: NA   Auditory/Visual Hallucinations:  yes. If yes, describe AVH? Patient sees ghosts like shadows or smoke and hearing them making a high pitched squealing sound and hearing them thud when they hit the walls or doors. Metabolic Screening:  No results found for: LABA1C  No results found for: CHOL  No results found for: TRIG  No results found for: HDL  No components found for: LDLCAL  No results found for: LABVLDL  Body mass index is 24.19 kg/m².   BP Readings from Last 2 Encounters:   12/18/22 118/85       PATIENT STRENGTHS and Barriers:   Family s      Tobacco Screening:  Practical Counseling, on admission, jem X, if applicable and completed (first 3 are required if patient doesn't refuse):            Recognizing danger situations (included triggers and roadblocks)   NA              Coping skills (new ways to manage stress, exercise, relaxation techniques, changing routine, distraction  NA                                                    Basic information about quitting (benefits of quitting, techniques in how to quit, available resources NA  Referral for counseling faxed to [facility]     NA                                       Patient refused counseling yes  Patient has not smoked in the last 30 days NA  Patient offered nicotine patch. Received offered  Refused yes  Patient is a non-smoker NA       Admission to Unit:    Pt admitted to [facility] under the care of Dr. Ricardo Bowens MD,  arrived on unit via [facility] with security and staff from ED    Patient arrived dressed in paper scrubs:  yes. Body assessment and safety check completed by Deondre Worthy RN and Marti Ahn RN and  yes contraband discovered. Patient belongings and valuables was cataloged and accounted for by Nir Officer, RN  . Admission completed by LOUISE Landeros RN  Oriented to unit, unit policy and expectations:  yes    Reviewed and explained all legal documents:  yes    Education for [place] and Restraints given: yes    Patient signed all legal documents no   Pt verbalizes understanding:no     Enrrique Asper Obtained? yes    Medical Bed:  Does patient require a medical bed? no  If answered yes for medical bed use, does the patient have the following conditions? High risk for falls? no   Obstructive sleep apnea? yes   Oxygen Use? no   Use of assistive devices? no   Other, (explain)? NA      Identifies stressors. yes   Drugs, Alcohol, and relationship with wife. Protective Factors:  Patient identifies protective factors with nursing staff as follows: Identifies reasons for living: Yes   Supportive Social Network or family: Yes    Belief that suicide is immoral/high spirituality: No   Responsibility to family or others/living with family: No   Fear of death or dying due to pain and suffering: No   Engaged in work or school: Yes     If Patient is unable to identify, reason why? NA          Admission Note:    Patient is upset that he has to stay. He received Haldol and Ativan in the ED before transferring to the unit from the ED. Patient is calm and cooperative with staff.   After patient vitals and search, patient retires to his room and no complaints are noted for the rest of the shift.           Electronically signed by Slim Whitney RN on 12/18/22 at 5:19 AM CST

## 2022-12-18 NOTE — ED NOTES
Pt changed into purple scrubs; belongings removed from room and given to security. Sitter at bedside.      Eden Dumont RN  12/17/22 [id number]

## 2022-12-18 NOTE — PROGRESS NOTES
[facility] Adult Unit Daily Assessment  Nursing Progress Note    Room: [address] [place] [address]   Name: Linda Scott   Age: 30 y.o. Gender: male   Dx: Psychosis, unspecified psychosis type ([address]  Precautions: suicide risk  Inpatient Status: involuntary       SLEEP:  Sleep Quality Good  Sleep Medications: No   PRN Sleep Meds: No       MEDICAL:  Other PRN Meds: No   Med Compliant: Yes  Accu-Chek: No  Oxygen/CPAP/BiPAP: No  CIWA/CINA: No   PAIN Assessment: none  Side Effects from medication: No      Metabolic Screening:  No results found for: LABA1C  No results found for: CHOL  No results found for: TRIG  No results found for: HDL  No components found for: LDLCAL  No results found for: LABVLDL  Body mass index is 24.19 kg/m². BP Readings from Last 2 Encounters:   12/18/22 137/80         Medical Bed:   Is patient in a medical bed? no   If medical bed is in use, has nursing secured room while patient is awake and out of the room? no  Has safety checks by nursing been completed on the bed/room this shift? no    Protective Factors:  Patient identifies protective factors with nursing staff as follows: Identifies reasons for living: Yes   Supportive Social Network or family: Yes    Belief that suicide is immoral/high spirituality: Yes   Responsibility to family or others/living with family: Yes   Fear of death or dying due to pain and suffering: Yes   Engaged in work or school: Yes     If Patient is unable to identify, reason why? PSYCH:  Depression: 7   Anxiety: 0   SI denies suicidal ideation   Risk of Suicide: No Risk  HI Negative for homicidal ideation      AVH:no If Hallucinations are present, describe? GENERAL:  Appetite: good   Percent Meals: 75%   Social: No   Speech: normal   Appearance: appropriately dressed and healthy looking    GROUP:  Group Participation: No  Participation Quality: None    Notes: Patient alert and oriented times 4. Patient has been in room most of the day resting and sleeping.   Patient rates depression 7/10, anxiety 0/10, denies SI, HI, and AVH. He denies having any hallucinations today. Patient interacts well with staff with fair eye contact. Patient reports he is sleepy and would like to sleep. Encouraged patient to participate with activities. Will continue to monitor.          Electronically signed by Candance Copping, RN on 12/18/22 at 2:35 PM CST

## 2022-12-18 NOTE — PROGRESS NOTES
SW met with patient to complete psychosocial and lifetime CSSR-S on this date. Patients long and short-term goals discussed. Patient voiced understanding. Treatment plan sheet signed. Patient verbalized understanding of the treatment plan. Patient participated in goals and objectives of the treatment plan. Patient discussed safety plan with . In the last 6 months has the patient been a danger to self: No  In the last 6 months has the patient been a danger to others: No  Legal Guardian/POA: No    Provided patient with [employer] handout entitled \"Quitting Smoking. \"  Reviewed handout with patient: addressing dangers of smoking, developing coping skills, and providing basic information about quitting. Patient received all components practical counseling of tobacco practical counseling during the hospital stay.

## 2022-12-19 PROBLEM — F15.21 METHAMPHETAMINE USE DISORDER, SEVERE, IN EARLY REMISSION (HCC): Status: ACTIVE | Noted: 2022-12-19

## 2022-12-19 PROBLEM — F10.21 ALCOHOL USE DISORDER, SEVERE, IN EARLY REMISSION (HCC): Status: ACTIVE | Noted: 2022-12-19

## 2022-12-19 PROBLEM — F12.21 CANNABIS USE DISORDER, MODERATE, IN EARLY REMISSION (HCC): Status: ACTIVE | Noted: 2022-12-19

## 2022-12-19 PROCEDURE — 1240000000 HC EMOTIONAL WELLNESS R&B

## 2022-12-19 PROCEDURE — 6370000000 HC RX 637 (ALT 250 FOR IP): Performed by: NURSE PRACTITIONER

## 2022-12-19 PROCEDURE — 6370000000 HC RX 637 (ALT 250 FOR IP): Performed by: PSYCHIATRY & NEUROLOGY

## 2022-12-19 RX ORDER — RISPERIDONE 1 MG/1
1 TABLET, ORALLY DISINTEGRATING ORAL ONCE
Status: COMPLETED | OUTPATIENT
Start: 2022-12-19 | End: 2022-12-19

## 2022-12-19 RX ORDER — RISPERIDONE 1 MG/1
2 TABLET ORAL NIGHTLY
Status: DISCONTINUED | OUTPATIENT
Start: 2022-12-19 | End: 2022-12-22 | Stop reason: HOSPADM

## 2022-12-19 RX ADMIN — HYDROXYZINE HYDROCHLORIDE 25 MG: 25 TABLET, FILM COATED ORAL at 20:32

## 2022-12-19 RX ADMIN — Medication 5 MG: at 20:33

## 2022-12-19 RX ADMIN — RISPERIDONE 1 MG: 1 TABLET, ORALLY DISINTEGRATING ORAL at 09:59

## 2022-12-19 RX ADMIN — HYDROXYZINE HYDROCHLORIDE 25 MG: 25 TABLET, FILM COATED ORAL at 09:59

## 2022-12-19 RX ADMIN — ACETAMINOPHEN 650 MG: 325 TABLET ORAL at 23:11

## 2022-12-19 RX ADMIN — RISPERIDONE 2 MG: 1 TABLET ORAL at 20:32

## 2022-12-19 RX ADMIN — TRAZODONE HYDROCHLORIDE 50 MG: 50 TABLET ORAL at 20:32

## 2022-12-19 ASSESSMENT — PAIN DESCRIPTION - DESCRIPTORS: DESCRIPTORS: ACHING;DISCOMFORT

## 2022-12-19 ASSESSMENT — PAIN - FUNCTIONAL ASSESSMENT: PAIN_FUNCTIONAL_ASSESSMENT: ACTIVITIES ARE NOT PREVENTED

## 2022-12-19 ASSESSMENT — PAIN DESCRIPTION - ORIENTATION: ORIENTATION: RIGHT

## 2022-12-19 ASSESSMENT — PAIN SCALES - GENERAL
PAINLEVEL_OUTOF10: 3
PAINLEVEL_OUTOF10: 5

## 2022-12-19 ASSESSMENT — PAIN DESCRIPTION - LOCATION: LOCATION: SHOULDER

## 2022-12-19 NOTE — PROGRESS NOTES
Group Note    Date: 12/19/22  Start Time: 8:00 AM   End Time:8:45 AM     Number of Participants: 13    Type of Group: Community/Goal     Patient's Goal:  \"Don't know\"    Notes:      Status After Intervention:      Participation Level:  Active Listener    Participation Quality: Appropriate    Speech:  normal    Thought Process/Content: Logical    Mood:  Calm    Level of consciousness:  Alert    Response to Learning: Able to verbalize current knowledge/experience    Modes of Intervention: Education and Support    Discipline Responsible: Behavioral Health Technician     Signature:  Nawaf Ortiz
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
## 2022-12-19 NOTE — PROGRESS NOTES
Patient is in room sitting on side of bed crying. Slightly irritable. Reports his anxiety his high and says I need to go home today because he has to get back to work. Atarax and risperdal M tab administered to patient at this time. Will continue to monitor.

## 2022-12-19 NOTE — PROGRESS NOTES
[facility] Adult Unit Daily Assessment  Nursing Progress Note     Room: [facility]   Name: Zarina Huddleston   Age: 30 y.o. Gender: male   Dx: Psychosis, unspecified psychosis type ([address]  Precautions: suicide risk  Inpatient Status: involuntary         SLEEP:  Sleep Quality Good          MEDICAL:  Other PRN Meds: No   Med Compliant: Yes  Accu-Chek: No  Oxygen/CPAP/BiPAP: No  CIWA/CINA: No   PAIN Assessment: none  Side Effects from medication: No                Medical Bed:   Is patient in a medical bed? no   If medical bed is in use, has nursing secured room while patient is awake and out of the room? no  Has safety checks by nursing been completed on the bed/room this shift? no     Protective Factors:  Patient identifies protective factors with nursing staff as follows: Identifies reasons for living: Yes              Supportive Social Network or family: Yes               Belief that suicide is immoral/high spirituality: Yes              Responsibility to family or others/living with family: Yes              Fear of death or dying due to pain and suffering: Yes              Engaged in work or school: Yes                If Patient is unable to identify, reason why? PSYCH:  Depression: 4  Anxiety: 10  SI denies suicidal ideation   Risk of Suicide: No Risk  HI Negative for homicidal ideation      AVH:no If Hallucinations are present, describe? GENERAL:  Appetite: good   Percent Meals: 100%   Social: No   Speech: normal   Appearance: appropriately dressed and healthy looking     GROUP:  Group Participation: No  Participation Quality: None    Notes: Patient is observed in room sitting on side of bed crying. Reports he is upset he is not able to go home today because he needs to get back to work. Wearing casual attire. Appropriately dressed and well groomed. He is alert and oriented x 4. Pleasant, calm, and cooperative. Reports good sleep lastnight and good appetite.  He is compliant with medications. Performs ADLs. He has been isolated to room, not social and not attending groups. Cooperative during interview with poor eye contact during interview with fair concentration. Judgement and insight are poor. Thought processes are circumstantial. Withdrawn, affect is restricted. Denies suicidal ideation, homicidal ideation, and hallucinations. No evidence of delusions or paranoia at this encounter.     Electronically signed by Marixa Partida RN on 12/19/22 at 10:10 AM CST

## 2022-12-19 NOTE — PLAN OF CARE
Group Therapy Note    Date: 12/19/2022  Start Time: 1000  End Time:  1030  Number of Participants: 10    Type of Group: Psychoeducation    Wellness Binder Information  Module Name:  Relapse Prevention  Session Number:  5    Group Goal for Pt: To improve knowledge of relapse prevention strategies    Notes:  Pt did not attend group discussion. Pt was invited/encouraged. Status After Intervention:      Participation Level:     Participation Quality:       Speech:         Thought Process/Content:       Affective Functioning:       Mood:       Level of consciousness:        Response to Learning:       Endings:     Modes of Intervention:       Discipline Responsible:       Signature:  Dakotah Finley

## 2022-12-19 NOTE — PLAN OF CARE
Problem: Psychosis  Goal: Will report no hallucinations or delusions  12/19/2022 1608 by Velvet Cruz    Group Therapy Note     Date: 12/19/2022  Start Time: 7905  End Time:  1600  Number of Participants: 8     Type of Group: Recovery     Wellness Binder Information  Module Name:  Emotional wellness  Session Number:  1     Patient's Goal:  validation of feelings     Notes:  pt acknowledged to have feelings validated It may be necessary to share feelings with others.      Status After Intervention:  Improved     Participation Level: Interactive     Participation Quality: Appropriate, Attentive, and Sharing        Speech:  normal        Thought Process/Content: Logical        Affective Functioning: Congruent        Mood:  congruent        Level of consciousness:  Alert, Oriented x4, and Attentive        Response to Learning: Able to verbalize current knowledge/experience        Endings: None Reported     Modes of Intervention: Education        Discipline Responsible: Psychoeducational Specialist        Signature:  Velvet Cruz                 Outcome: Progressing

## 2022-12-19 NOTE — PROGRESS NOTES
Group Note    Date: 12/19/22  Start Time: 8:00 PM   End Time:8:30 PM     Number of Participants: 15    Type of Group: Wrap-Up     Patient's Goal:      Notes:      Status After Intervention:  Unchanged    Participation Level: Interactive    Participation Quality: Attentive    Speech:  normal    Thought Process/Content: Logical    Mood:  appropriate for group    Level of consciousness:  Alert    Response to Learning: Progressing to goal    Modes of Intervention: Education and Support    Discipline Responsible: Behavioral Health Technician     Signature:  Guillermo Rivera

## 2022-12-19 NOTE — GROUP NOTE
Group Therapy Note    Date: 12/19/2022    Group Start Time: 4893  Group End Time: 9437  Group Topic: Nursing    [facility]    Silvana Donovan RN    Group Therapy Note                                                                    Group Note    Date: 12/19/22  Start Time: 5:15 PM   End Time:5:45 PM     Number of Participants: 11    Type of Group: Nursing Education     Patient's Goal: Better understanding of current medications    Notes:      Status After Intervention:  Improved    Participation Level:  Active Listener    Participation Quality: Appropriate    Speech:  normal    Thought Process/Content: Linear    Mood:  cooperative, calm    Level of consciousness:  Alert    Response to Learning: Able to verbalize/acknowledge new learning    Modes of Intervention: Education and Support    Discipline Responsible: Registered Nurse     Signature:  Silvana Donovan RN   Electronically signed by Silvana Donovan RN on 12/19/2022 at 5:59 PM

## 2022-12-19 NOTE — PROGRESS NOTES
1420- patient placed on a 1 to 1 at this time for safety due to collateral received by patient's mother by nurse practitioner, Lazara. Patients mother called and reported that the patient had told her that if she did not come and pick him up that he was going to hang himself in his room. He also reported to his mother that he had been hiding the means to do it in his room. Writer began sitting with patient in room. Writer searched room for contraband or other items patient may have been hiding in room. Yogi norris also searched room. Safety check completed with no items found. 1515-Patient moved to room closer to nurses station, room 603, with 1 to 1 sitter at bedside. He has been calm, pleasant, and cooperative. Will continue to monitor.

## 2022-12-19 NOTE — PLAN OF CARE
Problem: Involuntary Admit  Goal: Will cooperate with staff recommendations and doctor's orders and will demonstrate appropriate behavior  12/19/2022 1142 by Doris Donohue  Outcome: Progressing      Group Therapy Note     Date: 12/19/2022  Start Time: 1100  End Time:  1277  Number of Participants: 10     Type of Group: Psychotherapy     Wellness Binder Information  Module Name:  staying well  Session Number:  1     Patient's Goal:  daily maintenance and coping skills     Notes:  pt acknowledged use of positive coping skills daily to help stay well     Status After Intervention:  Improved     Participation Level: Interactive     Participation Quality: Appropriate, Attentive, and Sharing        Speech:  normal        Thought Process/Content: Logical        Affective Functioning: Congruent        Mood: congruent        Level of consciousness:  Alert, Oriented x4, and Attentive        Response to Learning: Able to verbalize current knowledge/experience        Endings: None Reported     Modes of Intervention: Education        Discipline Responsible: Psychoeducational Specialist        Signature:  Doris Donohue

## 2022-12-19 NOTE — PROGRESS NOTES
Treatment Team Note:    Target Symptoms/Reason for admission: Per nursing admission assessment - Reason for Admission: Alba Tijerina is a 30 y.o. male who presents to the emergency department for a mental health evaluation. Patient has been seeing ghosts for 2 weeks. Says that sometimes he will see something on the corner of his eyes like a dark shadow. Sometimes he sees stuff on the bed that makes the sheets move. No active hallucinations at this time. Felt like a ghost bit him on his right ear and right arm earlier. He has no signs of trauma. No HI or SI. He has no complaints at this time. Family did say that his wife recently had an EPO put out against him because of strange behavior. Family said that wife told him that she has been tracking him with his phone and that he has not really slept in the last 4 days and has been going to multiple gas stations. She is concerned that he may be using kratom. Family says that wife states that she was with him when he bought it but family thinks she may be unreliable. Does have a history of meth use in October of this year. Also has a history of alcohol use. Patient denies any meth use since October. Denies any ingestion or drug use of any kind. Patient has no inpatient or outpatient psychiatric treatment and no history of suicide attempts. Diagnoses per psych provider: Hallucinations [R44.3]  Psychosis, unspecified psychosis type [facility]) [F29]    Therapist met with treatment team to discuss patients treatment and discharge plans.     Patient's aftercare plan is: SW will meet with patient to gather information    Aftercare appointments made: No - SW will make discharge appointments    Pt lives with: mother    Collateral obtained from: mom  Collateral obtained on:12/19/22    Attending groups: Yes    Behavior: cooperative    Has patient been completing ADL's:  Yes    SI:  patient denies SI  Plan: no   If yes describe: N/A - patient denies plan  HI: patient denies HI  If present describe: N/A  Delusions: patient denies delusions  If present describe: N/A  Hallucinations: patient denies hallucinations  If present describe: N/A    Patient rates their -- Depression: 1-10:  0  Anxiety:1-10:  0    Sleeping: Fair    Taking medication: Yes    Misc:

## 2022-12-19 NOTE — PROGRESS NOTES
[address] [place] [address]      Psychiatric Progress Note    Name:  Linda Scott  Date:  12/19/2022  Age:  30 y.o. Sex:  male  Ethnicity:   Primary Care Physician:  Gordon Kaur MD   Patient Care Team:  Patient Care Team:  Gordon Kaur MD as PCP - General  Chief Complaint: \" I was seeing ghosts. \"        Historian:patient  Complaint Type: anxiety, decreased appetite, depression, illegal drug usage, loss of interest in favorite activities, sleep disturbance, and tobacco use  Course of Symptoms: ongoing  Precipitating Factors:  history  of polysubstance abuse, in early remission -last used meth 2 weeks ago      Subjective  Nursing notes reviewed and patient had no behavioral issues during the night. No as needed medications were administered during the night. Today he reports he came to the hospital because he was \"seeing ghosts. \"  He also reports that he felt something moving on top of the covers while at home. Reports prior to coming into the hospital he thought that a ghost had bit him on the ear and arm. He reports he has not had any auditory or visual hallucinations since being in the hospital and starting medications he admits to methamphetamine use for 1 month and reports his last use was 2 weeks ago. He also admits to using kratom 1 month ago. He is very upset that he is in the hospital at this time reports he \"cannot stay here until his involuntary hold is up. \"  He reported that he felt he was going to have a panic attack. Becomes tearful as well. Patient reports sleep as \"I slept all night. \"  Patient has been calm and cooperative with staff and peers. Patient has been compliant with medications. Patient has been attending groups. Patient reports appetite as \"I am eating all of my meals. \"          Patient reports no side effects from medications.       Objective  Vitals:    12/19/22 0813   BP: 114/71   Pulse: (!) 110   Resp: 16   Temp: 97.3 °F (36.3 °C)   SpO2: 99% Previous Psychiatric/Substance Use History      Medical History:  History reviewed. No pertinent past medical history. SKINNER History:   Social History     Substance and Sexual Activity   Alcohol Use Yes    Comment: 2 x weekly         Social History     Substance and Sexual Activity   Drug Use Not Currently    Types: Methamphetamines (Crystal Meth)        Social History     Tobacco Use   Smoking Status Every Day    Packs/day: 0.50    Types: Cigarettes   Smokeless Tobacco Not on file        Family History:     History reviewed. No pertinent family history. Mental Status:  Level of consciousness:  within normal limits and awake  Appearance:  well-appearing, street clothes, in chair, good grooming, and good hygiene  Behavior/Motor:  no abnormalities noted  Attitude toward examiner:  cooperative, attentive, and good eye contact  Speech:  normal rate and normal volume  Mood:  \" I feel like I am going to have a panic attack in here. \"  Affect:  anxious  Thought processes:  linear and goal directed  Thought content:  Homocidal ideation : denies  Suicidal Ideation:  denies suicidal ideation  Delusions:  no evidence of delusions  Perceptual Disturbance:  denies any perceptual disturbance  Cognition:  oriented to person, place, and time  Concentration : good  Memory intact for recent and remote  Fund of knowledge:  average  Abstract thinking:  adequate  Insight:  limited  Judgment:  limited      traZODone  50 mg Oral Nightly    melatonin  5 mg Oral Nightly    risperiDONE  1 mg Oral Nightly       Current Medications:  Current Facility-Administered Medications   Medication Dose Route Frequency Provider Last Rate Last Admin    acetaminophen (TYLENOL) tablet 650 mg  650 mg Oral Q4H PRN Irene Mcnamara MD        polyethylene glycol (GLYCOLAX) packet 17 g  17 g Oral Daily PRN Irene Mcnamara MD        traZODone (DESYREL) tablet 50 mg  50 mg Oral Nightly Irene Mcnamara MD   50 mg at 12/18/22 2052    melatonin disintegrating tablet 5 mg  5 mg Oral Nightly Ernestine Malcolm MD   5 mg at 12/18/22 2052    hydrOXYzine HCl (ATARAX) tablet 25 mg  25 mg Oral TID PRN Ernestine Malcolm MD   25 mg at 12/19/22 0959    risperiDONE (RISPERDAL) tablet 1 mg  1 mg Oral Nightly Ernestine Malcolm MD   1 mg at 12/18/22 2052    nicotine polacrilex (NICORETTE) gum 2 mg  2 mg Oral Q2H PRN Ernestine Malcolm MD           Psychotherapy:   SUPPORTIVE    DSM V Diagnoses:    Psychosis, unspecified  Methamphetamine use disorder, severe  Opioids (kratom) use disorder, severe  Cannabis use disorder, severe  Alcohol use disorder, severe  Tobacco use disorder, severe  Rule out drug-induced psychosis  Insomnia        Plan:    Encourage group therapy  15 minute safety checks  Medical monitoring by Dr. Gregorio Díaz and associates  Continue current therapy and medications  Social work to obtain collateral     Amount of time spent with patient:  15 minutes with greater than 50% of the time spent in counseling and collaboration of care.     Krzysztof Paiz, PMHNP-BC, FNP-C   Clinician Signature: signed electronically

## 2022-12-19 NOTE — GROUP NOTE
Group Therapy Note    Date: 12/19/2022    Group Start Time: 1600  Group End Time: 7694  Group Topic: Healthy Living/Wellness    [facility]    Marietta Grey RN; Vianca Thao RN    Group Therapy Note                                                                    Group Note    Date: 12/19/22  Start Time: 4:00 PM   End Time:4:45 PM     Number of Participants: 4    Type of Group: Health Living/Wellness     Patient's Goal: Socialization and relaxation     Notes: Pt prompted and encouraged to attend groups, pt refused      Status After Intervention:      Participation Level:     Participation Quality:     Speech:      Thought Process/Content:     Mood:     Level of consciousness:     Response to Learning:     Modes of Intervention: Education and Support    Discipline Responsible: Registered Nurse     Signature:  Vianca Thao RN   Electronically signed by Vianca Thao RN on 12/19/2022 at 4:51 PM

## 2022-12-19 NOTE — PROGRESS NOTES
Patients mother called and reported that the patient had told her that if she did not come and pick him up that he was going to hang himself in his room. He also reported to his mother that he had been hiding the means to do it in his room. Patient will be placed on a 1:1 at this time for safety.      Krzysztof Paiz, PMGILP-BC, FNP-C

## 2022-12-19 NOTE — PROGRESS NOTES
[facility] Adult Unit Daily Assessment  Nursing Progress Note    Room: [address] [place], [place] [address]A-   Name: Jenniffer Schaefer   Age: 30 y.o. Gender: male   Dx: Psychosis, unspecified psychosis type ([address]  Precautions: suicide risk and seizure precautions  Inpatient Status: involuntary       SLEEP:  Sleep Quality Fair  Sleep Medications: Yes   PRN Sleep Meds: No       MEDICAL:  Other PRN Meds: No   Med Compliant: Yes  Accu-Chek: No  Oxygen/CPAP/BiPAP: No  CIWA/CINA: No   PAIN Assessment: none  Side Effects from medication: No      Metabolic Screening:  No results found for: LABA1C  No results found for: CHOL  No results found for: TRIG  No results found for: HDL  No components found for: LDLCAL  No results found for: LABVLDL  Body mass index is 24.19 kg/m². BP Readings from Last 2 Encounters:   12/18/22 102/75         Medical Bed:   Is patient in a medical bed? no   If medical bed is in use, has nursing secured room while patient is awake and out of the room? NA  Has safety checks by nursing been completed on the bed/room this shift? NA    Protective Factors:  Patient identifies protective factors with nursing staff as follows: Identifies reasons for living: Yes   Supportive Social Network or family: Yes    Belief that suicide is immoral/high spirituality: Yes   Responsibility to family or others/living with family: Yes   Fear of death or dying due to pain and suffering: Yes   Engaged in work or school: Yes     If Patient is unable to identify, reason why? PSYCH:  Depression: 0   Anxiety: 0   SI denies suicidal ideation   Risk of Suicide: No Risk  HI Negative for homicidal ideation      AVH:no If Hallucinations are present, describe? GENERAL:  Appetite: no change from normal   Percent Meals:    Social: No   Speech: normal   Appearance: appropriately dressed    GROUP:  Group Participation: yes  Participation Quality: Interactive    Notes:   Patient has been in bed this shift the majority of this shift.  He was easily awaken for his assessment this evening. He is denying SI, HI and AVH this evening. No complaints voiced this evening.        Electronically signed by Barb Patton RN on 12/19/22 at 12:39 AM CST

## 2022-12-19 NOTE — PLAN OF CARE
Problem: Self Harm/Suicidality  Goal: Will have no self-injury during hospital stay  Outcome: Progressing     Problem: Depression  Goal: Will be euthymic at discharge  Outcome: Progressing     Problem: Psychosis  Goal: Will report no hallucinations or delusions  Outcome: Progressing     Problem: Drug Abuse/Detox  Goal: Will have no detox symptoms and will verbalize plan for changing drug-related behavior  Outcome: Progressing     Problem: Sleep Disturbance  Goal: Will exhibit normal sleeping pattern  Outcome: Progressing     Problem: Involuntary Admit  Goal: Will cooperate with staff recommendations and doctor's orders and will demonstrate appropriate behavior  Outcome: Progressing     Problem: Confusion  Goal: Confusion, delirium, dementia, or psychosis is improved or at baseline  Outcome: Progressing     Problem: Depression/Self Harm  Goal: Effect of psychiatric condition will be minimized and patient will be protected from self harm  Outcome: Progressing

## 2022-12-20 LAB
TSH REFLEX FT4: 1.41 UIU/ML (ref 0.35–5.5)
VITAMIN B-12: 310 PG/ML (ref 211–946)
VITAMIN D 25-HYDROXY: 38.3 NG/ML

## 2022-12-20 PROCEDURE — 84443 ASSAY THYROID STIM HORMONE: CPT

## 2022-12-20 PROCEDURE — 36415 COLL VENOUS BLD VENIPUNCTURE: CPT

## 2022-12-20 PROCEDURE — 6370000000 HC RX 637 (ALT 250 FOR IP): Performed by: FAMILY MEDICINE

## 2022-12-20 PROCEDURE — 82607 VITAMIN B-12: CPT

## 2022-12-20 PROCEDURE — 6370000000 HC RX 637 (ALT 250 FOR IP): Performed by: NURSE PRACTITIONER

## 2022-12-20 PROCEDURE — 82306 VITAMIN D 25 HYDROXY: CPT

## 2022-12-20 PROCEDURE — 1240000000 HC EMOTIONAL WELLNESS R&B

## 2022-12-20 PROCEDURE — 6370000000 HC RX 637 (ALT 250 FOR IP): Performed by: PSYCHIATRY & NEUROLOGY

## 2022-12-20 RX ORDER — CHOLECALCIFEROL (VITAMIN D3) 125 MCG
500 CAPSULE ORAL DAILY
Status: DISCONTINUED | OUTPATIENT
Start: 2022-12-20 | End: 2022-12-22 | Stop reason: HOSPADM

## 2022-12-20 RX ADMIN — Medication 5 MG: at 20:59

## 2022-12-20 RX ADMIN — TRAZODONE HYDROCHLORIDE 50 MG: 50 TABLET ORAL at 20:59

## 2022-12-20 RX ADMIN — HYDROXYZINE HYDROCHLORIDE 25 MG: 25 TABLET, FILM COATED ORAL at 09:43

## 2022-12-20 RX ADMIN — HYDROXYZINE HYDROCHLORIDE 25 MG: 25 TABLET, FILM COATED ORAL at 17:57

## 2022-12-20 RX ADMIN — NICOTINE POLACRILEX 2 MG: 2 GUM, CHEWING BUCCAL at 10:39

## 2022-12-20 RX ADMIN — ACETAMINOPHEN 650 MG: 325 TABLET ORAL at 21:13

## 2022-12-20 RX ADMIN — CYANOCOBALAMIN TAB 500 MCG 500 MCG: 500 TAB at 10:35

## 2022-12-20 RX ADMIN — RISPERIDONE 2 MG: 1 TABLET ORAL at 20:59

## 2022-12-20 ASSESSMENT — PAIN DESCRIPTION - PAIN TYPE: TYPE: ACUTE PAIN

## 2022-12-20 ASSESSMENT — PAIN DESCRIPTION - ONSET: ONSET: ON-GOING

## 2022-12-20 ASSESSMENT — PAIN SCALES - GENERAL
PAINLEVEL_OUTOF10: 0
PAINLEVEL_OUTOF10: 9

## 2022-12-20 ASSESSMENT — PAIN DESCRIPTION - ORIENTATION: ORIENTATION: RIGHT

## 2022-12-20 ASSESSMENT — PAIN DESCRIPTION - DESCRIPTORS: DESCRIPTORS: ACHING;THROBBING

## 2022-12-20 ASSESSMENT — PAIN - FUNCTIONAL ASSESSMENT: PAIN_FUNCTIONAL_ASSESSMENT: ACTIVITIES ARE NOT PREVENTED

## 2022-12-20 ASSESSMENT — PAIN DESCRIPTION - LOCATION: LOCATION: SHOULDER

## 2022-12-20 ASSESSMENT — PAIN DESCRIPTION - FREQUENCY: FREQUENCY: INTERMITTENT

## 2022-12-20 NOTE — PROGRESS NOTES
Progress Note  Tammy Spear  12/20/2022 10:19 AM  Subjective:   Admit Date:   12/17/2022      CC/ADMIT DX:       Interval History:   Reviewed overnight events and nursing notes. He denies any new physical complaints. I have reviewed all labs/diagnostics from the last 24hrs. ROS:   I have done a 10 point ROS and all are negative, except what is mentioned in the HPI. ADULT DIET; Regular    Medications:      vitamin B-12  500 mcg Oral Daily    risperiDONE  2 mg Oral Nightly    traZODone  50 mg Oral Nightly    melatonin  5 mg Oral Nightly           Objective:   Vitals: /81   Pulse (!) 115   Temp 97.5 °F (36.4 °C) (Temporal)   Resp 16   Ht 6' 3\" (1.905 m)   Wt 193 lb 8 oz (87.8 kg)   SpO2 100%   BMI 24.19 kg/m²  No intake or output data in the 24 hours ending 12/20/22 1019  General appearance: alert and cooperative with exam  Extremities: extremities normal, atraumatic, no cyanosis or edema  Neurologic:  No obvious focal neurologic deficits. Skin: no rashes    Assessment and Plan:   Principal Problem:    Psychosis, unspecified psychosis type ([address]  Active Problems:    Hallucinations    Methamphetamine use disorder, severe, in early remission ([address]    Cannabis use disorder, moderate, in early remission ([address]    Alcohol use disorder, severe, in early remission ([address]  Resolved Problems:    * No resolved hospital problems. *    Elevated BP    Plan:   Continue present medication(s)    Monitor BP   Follow with Psych      Discharge planning:   home     Reviewed treatment plans with the patient and/or family.              Electronically signed by Bryce Oswald MD on 12/20/2022 at 10:19 AM

## 2022-12-20 NOTE — PLAN OF CARE
Problem: Psychosis  Goal: Will report no hallucinations or delusio   Group Therapy Note     Date: 12/20/2022  Start Time: 1100  End Time:  8251  Number of Participants: 5     Type of Group: Psychoeducation     Wellness Binder Information  Module Name:  emotional wellness  Session Number:  1     Patient's Goal:  validation of feelings     Notes:  pt acknowledged to have feelings validated it may be necessary to share feelings with others.      Status After Intervention:  Improved     Participation Level: Interactive     Participation Quality: Appropriate, Attentive, and Sharing        Speech:  normal        Thought Process/Content: Logical        Affective Functioning: Congruent        Mood: congruent        Level of consciousness:  Alert, Oriented x4, and Attentive        Response to Learning: Able to verbalize current knowledge/experience        Endings: None Reported     Modes of Intervention: Education        Discipline Responsible: Psychoeducational Specialist        Signature:  Jeet hinojosa  12/20/2022 1151 by Jeet Peck  Outcome: Progressing

## 2022-12-20 NOTE — PROGRESS NOTES
Group Note    Date: 12/20/22  Start Time: 8:00 AM   End Time:8:30 AM     Number of Participants: 12    Type of Group: Community/Goal     Patient's Goal:  \"groups\"    Notes:      Status After Intervention:  Improved    Participation Level:  Active Listener    Participation Quality: Appropriate    Speech:  normal    Thought Process/Content: Logical    Mood:  calm    Level of consciousness:  Alert    Response to Learning: Able to verbalize current knowledge/experience    Modes of Intervention: Education and Support    Discipline Responsible: Behavioral Health Technician     Signature:  Mitch Rdz

## 2022-12-20 NOTE — PROGRESS NOTES
[address] [place] [address]      Psychiatric Progress Note    Name:  Delano Valencia  Date:  12/20/2022  Age:  30 y.o. Sex:  male  Ethnicity:   Primary Care Physician:  Edmundo Chavarria MD   Patient Care Team:  Patient Care Team:  Edmundo Chavarria MD as PCP - General  Chief Complaint: \" I am doing much better today. \"        Historian:patient  Complaint Type: anxiety, decreased appetite, depression, fatigue, loss of interest in favorite activities, sleep disturbance, and tobacco use  Course of Symptoms: improved  Precipitating Factors: patient abusing Kratom       Subjective  Nursing notes were reviewed and patient had no behavioral issues during the night. As needed medications administered during the night include Tylenol and Hydroxyzine. Today he denies suicidal ideation, homicidal ideation and psychosis. Yesterday afternoon patient had to be placed on a 1:1 and moved closer to the nursing station. He told his mother over the phone that he was going to hang himself while on the unit if she did not get him out of here. His mother then called the unit and made staff aware. Today he reports that he only made that statement because he was upset. Today he reports that he would \"never do that. \" He has been out in the milieu and has been social with select peers. He rates depression and anxiety as a 2 on a 0-10 scale. He becomes tearful and reports that he is scared that his life will now be \"flipped upside down\" because his wife took out an EPO. Patient reports sleep as \"I am sleeping all night. \" Patient has been calm and cooperative with staff and peers. Patient has been compliant with medications. Patient has been attending groups. Patient reports appetite as \" I am eating all of my food. \" Patient reports no side effects from medications.       Objective  Vitals:    12/20/22 0802   BP: 111/81   Pulse: (!) 115   Resp: 16   Temp: 97.5 °F (36.4 °C)   SpO2: 100%       Previous Psychiatric/Substance Use History      Medical History:  History reviewed. No pertinent past medical history. SKINNER History:   Social History     Substance and Sexual Activity   Alcohol Use Yes    Comment: 2 x weekly         Social History     Substance and Sexual Activity   Drug Use Not Currently    Types: Methamphetamines (Crystal Meth)        Social History     Tobacco Use   Smoking Status Every Day    Packs/day: 0.50    Types: Cigarettes   Smokeless Tobacco Not on file        Family History:     History reviewed. No pertinent family history. Mental Status:  Level of consciousness:  within normal limits and awake  Appearance:  well-appearing, street clothes, in chair, good grooming, and good hygiene  Behavior/Motor:  no abnormalities noted  Attitude toward examiner:  cooperative, attentive, and good eye contact  Speech:  normal rate and normal volume  Mood:  \" I am doing better today. \"  Affect:  mood congruent  Thought processes:  linear and goal directed  Thought content:  Homocidal ideation :denies  Suicidal Ideation:  denies suicidal ideation  Delusions:  no evidence of delusions  Perceptual Disturbance:  denies any perceptual disturbance  Cognition:  oriented to person, place, and time  Concentration : good  Memory intact for recent and remote  Fund of knowledge:  average  Abstract thinking: adequate  Insight: improved  Judgment:  appropriate      vitamin B-12  500 mcg Oral Daily    risperiDONE  2 mg Oral Nightly    traZODone  50 mg Oral Nightly    melatonin  5 mg Oral Nightly       Current Medications:  Current Facility-Administered Medications   Medication Dose Route Frequency Provider Last Rate Last Admin    vitamin B-12 (CYANOCOBALAMIN) tablet 500 mcg  500 mcg Oral Daily Bryce Oswald MD   500 mcg at 12/20/22 1035    risperiDONE (RISPERDAL) tablet 2 mg  2 mg Oral Nightly EUGENIA Melendez   2 mg at 12/19/22 2032    acetaminophen (TYLENOL) tablet 650 mg  650 mg Oral Q4H PRN Hanford Bence, MD   650 mg at 12/19/22 2311    polyethylene glycol (GLYCOLAX) packet 17 g  17 g Oral Daily PRN Lindsay Doyle MD        traZODone (DESYREL) tablet 50 mg  50 mg Oral Nightly Lindsay Doyle MD   50 mg at 12/19/22 2032    melatonin disintegrating tablet 5 mg  5 mg Oral Nightly Lindsay Doyle MD   5 mg at 12/19/22 2033    hydrOXYzine HCl (ATARAX) tablet 25 mg  25 mg Oral TID PRN Lindsay Doyle MD   25 mg at 12/20/22 8853    nicotine polacrilex (NICORETTE) gum 2 mg  2 mg Oral Q2H PRN Lindsay Doyle MD   2 mg at 12/20/22 1039       Psychotherapy:   SUPPORTIVE    DSM V Diagnoses:    Psychosis, unspecified  Methamphetamine use disorder, severe  Opioids (kratom) use disorder, severe  Cannabis use disorder, severe  Alcohol use disorder, severe  Tobacco use disorder, severe  Rule out drug-induced psychosis  Insomnia      Plan:    Encourage group therapy  15 minute safety checks  Medical monitoring by Dr. Buffy Conner and associates  Continue current therapy and medications    Amount of time spent with patient:  15 minutes with greater than 50% of the time spent in counseling and collaboration of care.     Lizeth Osuna, PMGILP-BC, FNP-C   Clinician Signature: signed electronically

## 2022-12-20 NOTE — PROGRESS NOTES
[facility] Adult Unit Daily Assessment  Nursing Progress Note    Room: [facility]-   Name: Abby Gomez   Age: 30 y.o. Gender: male   Dx: Psychosis, unspecified psychosis type ([address]  Precautions: close watch, suicide risk, and seizure precautions  Inpatient Status: involuntary       SLEEP:  Sleep Quality Good  Sleep Medications: Yes   PRN Sleep Meds: No       MEDICAL:  Other PRN Meds: Yes   Med Compliant: Yes  Accu-Chek: No  Oxygen/CPAP/BiPAP: No  CIWA/CINA: No   PAIN Assessment: none  Side Effects from medication: No      Metabolic Screening:  No results found for: LABA1C  No results found for: CHOL  No results found for: TRIG  No results found for: HDL  No components found for: LDLCAL  No results found for: LABVLDL  Body mass index is 24.19 kg/m². BP Readings from Last 2 Encounters:   12/19/22 114/76         Medical Bed:   Is patient in a medical bed? no   If medical bed is in use, has nursing secured room while patient is awake and out of the room? NA  Has safety checks by nursing been completed on the bed/room this shift? NA    Protective Factors:  Patient identifies protective factors with nursing staff as follows: Identifies reasons for living: Yes   Supportive Social Network or family: Yes    Belief that suicide is immoral/high spirituality: Yes   Responsibility to family or others/living with family: Yes   Fear of death or dying due to pain and suffering: Yes   Engaged in work or school: Yes     If Patient is unable to identify, reason why? N/A      PSYCH:  Depression: 0   Anxiety: 4   SI denies suicidal ideation   Risk of Suicide: No Risk  HI Negative for homicidal ideation      AVH:no If Hallucinations are present, describe?          GENERAL:  Appetite: good   Percent Meals: 100%   Social: Yes   Speech: normal   Appearance: appropriately dressed and healthy looking    GROUP:  Group Participation: Yes  Participation Quality: Interactive    Notes:   Patient  observed seated in the day area watching television, 1:1 sitter seated with patient. Patient calm, pleasant, and cooperative with staff. Eye contact is fair. He reports that he told his mother today during a phone conversation that it was so boring here, if he had to stay here longer, he would hang himself. He reports that he jokingly said that and he would not attempt to actually harm himself. He is compliant with medications and has completed group wrap up. He currently denies SI, HI,and AVH.       Electronically signed by Katharine Jones LPN on 33/65/00 at 73:15 PM CST

## 2022-12-20 NOTE — PROGRESS NOTES
Progress Note  Nara Exon  12/19/2022 10:38 PM  Subjective:   Admit Date:   12/17/2022      CC/ADMIT DX:       Interval History:   Reviewed overnight events and nursing notes. He has no new medical issues. I have reviewed all labs/diagnostics from the last 24hrs. ROS:   I have done a 10 point ROS and all are negative, except what is mentioned in the HPI. ADULT DIET; Regular    Medications:      risperiDONE  2 mg Oral Nightly    traZODone  50 mg Oral Nightly    melatonin  5 mg Oral Nightly           Objective:   Vitals: /76   Pulse (!) 108   Temp 97.9 °F (36.6 °C) (Temporal)   Resp 16   Ht 6' 3\" (1.905 m)   Wt 193 lb 8 oz (87.8 kg)   SpO2 99%   BMI 24.19 kg/m²  No intake or output data in the 24 hours ending 12/19/22 2238  General appearance: alert and cooperative with exam  Extremities: extremities normal, atraumatic, no cyanosis or edema  Neurologic:  No obvious focal neurologic deficits. Skin: no rashes    Assessment and Plan:   Principal Problem:    Psychosis, unspecified psychosis type ([address]  Active Problems:    Hallucinations    Methamphetamine use disorder, severe, in early remission ([address]    Cannabis use disorder, moderate, in early remission ([address]    Alcohol use disorder, severe, in early remission ([address]  Resolved Problems:    * No resolved hospital problems. *    Elevated BP    Plan:   Continue present medication(s)    Follow with BP   Follow with Psych      Discharge planning:   home     Reviewed treatment plans with the patient and/or family.              Electronically signed by Radha Huffman MD on 12/19/2022 at 10:38 PM

## 2022-12-20 NOTE — PROGRESS NOTES
Patient was tearful when talking about his family. Patient stated he was seeing ghost in his home. Denies hearing any voices. Patient reported that his wife has an EPO against him. He stated that he does not known why. Patient stated that he has a 9year old son that live with his mother and him. 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 . Patient denies SI,HI. Continue to monitor 1 to 1 observation for safety.

## 2022-12-20 NOTE — PLAN OF CARE
Problem: Self Harm/Suicidality  Goal: Will have no self-injury during hospital stay  12/20/2022 1603 by Alexandr Zurita  Outcome: Marlon Moreau (Taken 12/20/2022 1116 by Corey Collins RN)  Will have no self-injury during hospital stay:   Maintain a safe environment   Ensure safety tray has been added to patient's diet order   Group Therapy Note     Date: 12/20/2022  Start Time: 9470  End Time:  1600  Number of Participants: 5     Type of Group: Recovery     Wellness Binder Information  Module Name:  emotional wellness  Session Number:  5     Patient's Goal:  obstacles to emotional wellness     Notes:  pt acknowledged negative thinking as an obstacle to emotional wellness. Status After Intervention:  Improved     Participation Level:  Active Listener     Participation Quality: Appropriate, Attentive, and Sharing        Speech:  normal        Thought Process/Content: Logical        Affective Functioning: Congruent        Mood: congruent        Level of consciousness:  Alert, Oriented x4, and Attentive        Response to Learning: Able to verbalize current knowledge/experience        Endings: None Reported     Modes of Intervention: Education        Discipline Responsible: Psychoeducational Specialist        Signature:  Alexandr Zurita

## 2022-12-20 NOTE — PROGRESS NOTES
Group Note    Date: 12/20/22  Start Time: 8:00 PM   End Time:8:30 PM     Number of Participants: 9    Type of Group: Wrap-Up     Patient's Goal:      Notes:      Status After Intervention:  Unchanged    Participation Level: Interactive    Participation Quality: Appropriate and Sharing    Speech:  normal    Thought Process/Content: Logical    Mood:  appropriate for group    Level of consciousness:  Alert    Response to Learning: Progressing to goal    Modes of Intervention: Education and Support    Discipline Responsible: Behavioral Health Technician     Signature:  Drea Hooper

## 2022-12-20 NOTE — PROGRESS NOTES
[facility] Adult Unit Daily Assessment  Nursing Progress Note    Room: [facility]/603-01   Name: Lavell Ventura   Age: 30 y.o. Gender: male   Dx: Psychosis, unspecified psychosis type ([address]  Precautions: suicide risk and elopement  Inpatient Status: involuntary       SLEEP:  Sleep Quality Good  Sleep Medications: Yes   PRN Sleep Meds: No       MEDICAL:  Other PRN Meds: Yes   Med Compliant: Yes  Accu-Chek: No  Oxygen/CPAP/BiPAP: No  CIWA/CINA: No   PAIN Assessment: none  Side Effects from medication: No      Metabolic Screening:  No results found for: LABA1C  No results found for: CHOL  No results found for: TRIG  No results found for: HDL  No components found for: LDLCAL  No results found for: LABVLDL  Body mass index is 24.19 kg/m². BP Readings from Last 2 Encounters:   12/20/22 111/81         Medical Bed:   Is patient in a medical bed? no   If medical bed is in use, has nursing secured room while patient is awake and out of the room? Has safety checks by nursing been completed on the bed/room this shift? Protective Factors:  Patient identifies protective factors with nursing staff as follows: Identifies reasons for living: Yes   Supportive Social Network or family: Yes    Belief that suicide is immoral/high spirituality: Yes   Responsibility to family or others/living with family: Yes   Fear of death or dying due to pain and suffering: Yes   Engaged in work or school: Yes     If Patient is unable to identify, reason why? PSYCH:  Depression: 1   Anxiety: 4   SI denies suicidal ideation   Risk of Suicide: No Risk  HI Negative for homicidal ideation      AVH:no If Hallucinations are present, describe? GENERAL:  Appetite: good   Percent Meals: %   Social: Yes   Speech: normal   Appearance: appropriately dressed and appropriately groomed    GROUP:  Group Participation: Yes  Participation Quality: Active Listener    Notes: Pt is alert and oriented x 4, calm and cooperative.  Remains 1:1. Brightened affect, mood congruent. Concentration and memory intact. Limited insight and judgment. Linear thought processes. Med compliant. Attends group. Social. Completes ADLs. Rates depression 1 and anxiety 4. Denies SI, HI, and AVH. Will continue to monitor pt.      Electronically signed by Nuvia Desai RN on 12/20/22 at 11:24 AM CST

## 2022-12-20 NOTE — PROGRESS NOTES
Treatment Team Note:     Target Symptoms/Reason for admission: Per nursing admission assessment - Reason for Admission: Abby Gomez is a 30 y.o. male who presents to the emergency department for a mental health evaluation. Patient has been seeing ghosts for 2 weeks. Says that sometimes he will see something on the corner of his eyes like a dark shadow. Sometimes he sees stuff on the bed that makes the sheets move. No active hallucinations at this time. Felt like a ghost bit him on his right ear and right arm earlier. He has no signs of trauma. No HI or SI. He has no complaints at this time. Family did say that his wife recently had an EPO put out against him because of strange behavior. Family said that wife told him that she has been tracking him with his phone and that he has not really slept in the last 4 days and has been going to multiple gas stations. She is concerned that he may be using kratom. Family says that wife states that she was with him when he bought it but family thinks she may be unreliable. Does have a history of meth use in October of this year. Also has a history of alcohol use. Patient denies any meth use since October. Denies any ingestion or drug use of any kind. Patient has no inpatient or outpatient psychiatric treatment and no history of suicide attempts. Diagnoses per psych provider: Hallucinations [R44.3]  Psychosis, unspecified psychosis type [facility]) [F29]     Therapist met with treatment team to discuss patients treatment and discharge plans.      Patient's aftercare plan is: SW will meet with patient to gather information     Aftercare appointments made: No - SW will make discharge appointments     Pt lives with: mother     Collateral obtained from: mom  Collateral obtained on:12/19/22     Attending groups: Yes     Behavior: cooperative     Has patient been completing ADL's:  Yes     SI:  patient denies SI  Plan: no   If yes describe: N/A - patient denies plan  HI:  patient denies HI  If present describe: N/A  Delusions: patient denies delusions  If present describe: N/A  Hallucinations: patient denies hallucinations  If present describe: N/A     Patient rates their -- Depression: 1-10:  0  Anxiety:1-10:  0     Sleeping: Fair     Taking medication: Yes     Misc:                                                             Revision History

## 2022-12-20 NOTE — PLAN OF CARE
Problem: Self Harm/Suicidality  Goal: Will have no self-injury during hospital stay  Description: INTERVENTIONS:  1. Ensure constant observer at bedside with Q15M safety checks  2. Maintain a safe environment  3. Secure patient belongings  4. Ensure family/visitors adhere to safety recommendations  5. Ensure safety tray has been added to patient's diet order  6. Every shift and PRN: Re-assess suicidal risk via Frequent Screener    Outcome: Progressing     Problem: Depression  Goal: Will be euthymic at discharge  Description: INTERVENTIONS:  1. Administer medication as ordered  2. Provide emotional support via 1:1 interaction with staff  3. Encourage involvement in milieu/groups/activities  4. Monitor for social isolation  Outcome: Progressing     Problem: Psychosis  Goal: Will report no hallucinations or delusions  Description: INTERVENTIONS:  1. Administer medication as  ordered  2. Assist with reality testing to support increasing orientation  3. Assess if patient's hallucinations or delusions are encouraging self harm or harm to others and intervene as appropriate  Outcome: Progressing     Problem: Drug Abuse/Detox  Goal: Will have no detox symptoms and will verbalize plan for changing drug-related behavior  Description: INTERVENTIONS:  1. Administer medication as ordered  2. Monitor physical status  3. Provide emotional support with 1:1 interaction with staff  4. Encourage  recovery focused treatment   Outcome: Progressing     Problem: Sleep Disturbance  Goal: Will exhibit normal sleeping pattern  Description: INTERVENTIONS:  1. Administer medication as ordered  2. Decrease environmental stimuli, including noise, as appropriate  3. Discourage social isolation and naps during the day  Outcome: Progressing     Problem: Involuntary Admit  Goal: Will cooperate with staff recommendations and doctor's orders and will demonstrate appropriate behavior  Description: INTERVENTIONS:  1.  Treat underlying conditions and offer medication as ordered  2. Educate regarding involuntary admission procedures and rules  3. Contain excessive/inappropriate behavior per unit and hospital policies  Outcome: Progressing     Problem: Confusion  Goal: Confusion, delirium, dementia, or psychosis is improved or at baseline  Description: INTERVENTIONS:  1. Assess for possible contributors to thought disturbance, including medications, impaired vision or hearing, underlying metabolic abnormalities, dehydration, psychiatric diagnoses, and notify attending LIP  2. [place] high risk fall precautions, as indicated  3. Provide frequent short contacts to provide reality reorientation, refocusing and direction  4. Decrease environmental stimuli, including noise as appropriate  5. Monitor and intervene to maintain adequate nutrition, hydration, elimination, sleep and activity  6. If unable to ensure safety without constant attention obtain sitter and review sitter guidelines with assigned personnel  7. Initiate Psychosocial CNS and Spiritual Care consult, as indicated  Outcome: Progressing     Problem: Depression/Self Harm  Goal: Effect of psychiatric condition will be minimized and patient will be protected from self harm  Description: INTERVENTIONS:  1. Assess impact of patient's symptoms on level of functioning, self care needs and offer support as indicated  2. Assess patient/family knowledge of depression, impact on illness and need for teaching  3. Provide emotional support, presence and reassurance  4. Assess for possible suicidal thoughts or ideation. If patient expresses suicidal thoughts or statements do not leave alone, initiate Suicide Precautions, move to a room close to the nursing station and obtain sitter  5.  Initiate consults as appropriate with Mental Health Professional, Spiritual Care, Psychosocial CNS, and consider a recommendation to the LIP for a Psychiatric Consultation  Outcome: Progressing

## 2022-12-21 PROCEDURE — 6370000000 HC RX 637 (ALT 250 FOR IP): Performed by: NURSE PRACTITIONER

## 2022-12-21 PROCEDURE — 6370000000 HC RX 637 (ALT 250 FOR IP): Performed by: FAMILY MEDICINE

## 2022-12-21 PROCEDURE — 1240000000 HC EMOTIONAL WELLNESS R&B

## 2022-12-21 PROCEDURE — 6370000000 HC RX 637 (ALT 250 FOR IP): Performed by: PSYCHIATRY & NEUROLOGY

## 2022-12-21 RX ORDER — RISPERIDONE 2 MG/1
2 TABLET ORAL NIGHTLY
Qty: 30 TABLET | Refills: 0 | Status: SHIPPED | OUTPATIENT
Start: 2022-12-21 | End: 2022-12-22 | Stop reason: SDUPTHER

## 2022-12-21 RX ORDER — HYDROXYZINE HYDROCHLORIDE 25 MG/1
25 TABLET, FILM COATED ORAL 3 TIMES DAILY PRN
Qty: 30 TABLET | Refills: 0 | Status: SHIPPED | OUTPATIENT
Start: 2022-12-21 | End: 2022-12-22 | Stop reason: SDUPTHER

## 2022-12-21 RX ADMIN — NICOTINE POLACRILEX 2 MG: 2 GUM, CHEWING BUCCAL at 17:25

## 2022-12-21 RX ADMIN — ACETAMINOPHEN 650 MG: 325 TABLET ORAL at 20:37

## 2022-12-21 RX ADMIN — TRAZODONE HYDROCHLORIDE 50 MG: 50 TABLET ORAL at 20:33

## 2022-12-21 RX ADMIN — NICOTINE POLACRILEX 2 MG: 2 GUM, CHEWING BUCCAL at 13:35

## 2022-12-21 RX ADMIN — CYANOCOBALAMIN TAB 500 MCG 500 MCG: 500 TAB at 08:45

## 2022-12-21 RX ADMIN — RISPERIDONE 2 MG: 1 TABLET ORAL at 20:32

## 2022-12-21 RX ADMIN — HYDROXYZINE HYDROCHLORIDE 25 MG: 25 TABLET, FILM COATED ORAL at 08:45

## 2022-12-21 RX ADMIN — Medication 5 MG: at 20:33

## 2022-12-21 RX ADMIN — HYDROXYZINE HYDROCHLORIDE 25 MG: 25 TABLET, FILM COATED ORAL at 17:25

## 2022-12-21 ASSESSMENT — PAIN SCALES - GENERAL: PAINLEVEL_OUTOF10: 6

## 2022-12-21 ASSESSMENT — PAIN DESCRIPTION - DESCRIPTORS: DESCRIPTORS: ACHING;DISCOMFORT

## 2022-12-21 ASSESSMENT — PAIN - FUNCTIONAL ASSESSMENT: PAIN_FUNCTIONAL_ASSESSMENT: ACTIVITIES ARE NOT PREVENTED

## 2022-12-21 ASSESSMENT — PAIN DESCRIPTION - LOCATION: LOCATION: SHOULDER

## 2022-12-21 ASSESSMENT — PAIN DESCRIPTION - ORIENTATION: ORIENTATION: RIGHT

## 2022-12-21 NOTE — PROGRESS NOTES
Treatment Team Note:    Target Symptoms/Reason for admission: Per nursing admission assessment - Reason for Admission: Nara Sepulveda is a 30 y.o. male who presents to the emergency department for a mental health evaluation. Patient has been seeing ghosts for 2 weeks. Says that sometimes he will see something on the corner of his eyes like a dark shadow. Sometimes he sees stuff on the bed that makes the sheets move. No active hallucinations at this time. Felt like a ghost bit him on his right ear and right arm earlier. He has no signs of trauma. No HI or SI. He has no complaints at this time. Family did say that his wife recently had an EPO put out against him because of strange behavior. Family said that wife told him that she has been tracking him with his phone and that he has not really slept in the last 4 days and has been going to multiple gas stations. She is concerned that he may be using kratom. Family says that wife states that she was with him when he bought it but family thinks she may be unreliable. Does have a history of meth use in October of this year. Also has a history of alcohol use. Patient denies any meth use since October. Denies any ingestion or drug use of any kind. Patient has no inpatient or outpatient psychiatric treatment and no history of suicide attempts. Diagnoses per psych provider: Hallucinations [R44.3]  Psychosis, unspecified psychosis type [facility]) [F29]    Therapist met with treatment team to discuss patients treatment and discharge plans. Patient's aftercare plan is: return to mother    Aftercare appointments made: [address]    Pt lives with: mother    Collateral obtained from:  Mom Lisa  Collateral obtained on:12/19/22    Attending groups: Yes    Behavior: cooperative, tearful at times, affect is flat, withdrawn, anxious, poor concentration, reports being worried about his wife and son.     Has patient been completing ADL's:  Yes    SI: patient denies SI  Plan: no   If yes describe: N/A - patient denies plan  HI:  patient denies HI  If present describe: N/A  Delusions: patient denies delusions  If present describe: N/A  Hallucinations: patient denies hallucinations  If present describe: N/A    Patient rates their -- Depression: 1-10:  0  Anxiety:1-10:  0    Sleeping:Yes    Taking medication: Yes    Misc: Tentative discharge Thursday.

## 2022-12-21 NOTE — PROGRESS NOTES
[address] [place] [address]      Psychiatric Progress Note    Name:  Edison Rubio  Date:  12/21/2022  Age:  30 y.o. Sex:  male  Ethnicity:   Primary Care Physician:  Maegan Leung MD   Patient Care Team:  Patient Care Team:  Maegan Leung MD as PCP - General  Chief Complaint: \" I am feeling better. \"        Historian:patient  Complaint Type: anxiety, depression, irritability, mood swings, sleep disturbance, and tobacco use  Course of Symptoms: improved  Precipitating Factors: marital issues, recent EPO placed upon him, upcoming court dates     Subjective  Nursing notes were reviewed and patient had no behavioral issues during the night. No as needed medications were administered during the night. Today he denies suicidal ideation, homicidal ideation and psychosis. He is tearful at times and is upset that he will not be able to see his son over [place] since his wife has placed an EPO against him. He reports that he \"should have never started using drugs. \" Plans on staying with his mother once he is discharged. Patient reports sleep as \" I am sleeping all night. \" Patient has been calm and cooperative with staff and peers. Patient has been compliant with medications. Patient has been attending groups. Patient reports appetite as \" I am eating all of my meals. \" Patient reports no side effects from medications. Objective  Vitals:    12/21/22 1336   BP:    Pulse: 88   Resp:    Temp:    SpO2:        Previous Psychiatric/Substance Use History      Medical History:  History reviewed. No pertinent past medical history.      SKINNER History:   Social History     Substance and Sexual Activity   Alcohol Use Yes    Comment: 2 x weekly         Social History     Substance and Sexual Activity   Drug Use Not Currently    Types: Methamphetamines (Crystal Meth)        Social History     Tobacco Use   Smoking Status Every Day    Packs/day: 0.50    Types: Cigarettes   Smokeless Tobacco Not on file Family History:     History reviewed. No pertinent family history. Mental Status:  Level of consciousness:  within normal limits and awake  Appearance:  well-appearing, street clothes, in chair, good grooming, and good hygiene  Behavior/Motor:  no abnormalities noted  Attitude toward examiner:  cooperative, attentive, and good eye contact  Speech:  normal rate and normal volume  Mood:  \"I am doing good, just ready to go home. \"  Affect:  mood congruent  Thought processes:  linear and goal directed  Thought content:  Homocidal ideation :denies  Suicidal Ideation:  denies suicidal ideation  Delusions:  no evidence of delusions  Perceptual Disturbance:  denies any perceptual disturbance  Cognition:  oriented to person, place, and time  Concentration : good  Memory intact for recent and remote  Fund of knowledge:  average  Abstract thinking:  adequate   Insight: improved-future oriented  Judgment: appropriate      vitamin B-12  500 mcg Oral Daily    risperiDONE  2 mg Oral Nightly    traZODone  50 mg Oral Nightly    melatonin  5 mg Oral Nightly       Current Medications:  Current Facility-Administered Medications   Medication Dose Route Frequency Provider Last Rate Last Admin    vitamin B-12 (CYANOCOBALAMIN) tablet 500 mcg  500 mcg Oral Daily Brandi Jimenez MD   500 mcg at 12/21/22 0845    risperiDONE (RISPERDAL) tablet 2 mg  2 mg Oral Nightly Zoya Diones, APRN   2 mg at 12/20/22 2059    acetaminophen (TYLENOL) tablet 650 mg  650 mg Oral Q4H PRN Pasty Holstein, MD   650 mg at 12/20/22 2113    polyethylene glycol (GLYCOLAX) packet 17 g  17 g Oral Daily PRN Pasty Holstein, MD        traZODone (DESYREL) tablet 50 mg  50 mg Oral Nightly Pasty Holstein, MD   50 mg at 12/20/22 2059    melatonin disintegrating tablet 5 mg  5 mg Oral Nightly Pasty Holstein, MD   5 mg at 12/20/22 2059    hydrOXYzine HCl (ATARAX) tablet 25 mg  25 mg Oral TID PRN Pasty Holstein, MD   25 mg at 12/21/22 0845    nicotine polacrilex (NICORETTE) gum 2 mg  2 mg Oral Q2H PRN Tima Prieto MD   2 mg at 12/21/22 0385       Psychotherapy:   SUPPORTIVE    DSM V Diagnoses:    Psychosis, unspecified  Methamphetamine use disorder, severe  Opioids (kratom) use disorder, severe  Cannabis use disorder, severe  Alcohol use disorder, severe  Tobacco use disorder, severe  Rule out drug-induced psychosis  Insomnia      Plan:    Encourage group therapy  15 minute safety checks  Medical monitoring by Dr. Adam Wahl and associates  Continue current therapy and medications  Patient will discharge tomorrow- patient will have to be seen by this writer before he can discharge at 10:30 am    Amount of time spent with patient:  15 minutes with greater than 50% of the time spent in counseling and collaboration of care.     Jensen Oconnor, PMGILP-BC, FNP-C   Clinician Signature: signed electronically

## 2022-12-21 NOTE — PROGRESS NOTES
[facility] Adult Unit Daily Assessment  Nursing Progress Note    Room: [facility]-   Name: Danika Nowak   Age: 30 y.o. Gender: male   Dx: Psychosis, unspecified psychosis type ([address]  Precautions: suicide risk  Inpatient Status: involuntary       SLEEP:  Sleep Quality Good  Sleep Medications: Yes   PRN Sleep Meds: No       MEDICAL:  Other PRN Meds: No   Med Compliant: Yes  Accu-Chek: No  Oxygen/CPAP/BiPAP: No  CIWA/CINA: No   PAIN Assessment: none  Side Effects from medication: No      Metabolic Screening:  No results found for: LABA1C  No results found for: CHOL  No results found for: TRIG  No results found for: HDL  No components found for: LDLCAL  No results found for: LABVLDL  Body mass index is 24.19 kg/m². BP Readings from Last 2 Encounters:   12/21/22 135/79         Medical Bed:   Is patient in a medical bed? no   If medical bed is in use, has nursing secured room while patient is awake and out of the room? no  Has safety checks by nursing been completed on the bed/room this shift? no    Protective Factors:  Patient identifies protective factors with nursing staff as follows: Identifies reasons for living: Yes   Supportive Social Network or family: Yes    Belief that suicide is immoral/high spirituality: Yes   Responsibility to family or others/living with family: Yes   Fear of death or dying due to pain and suffering: Yes   Engaged in work or school: No     If Patient is unable to identify, reason why? PSYCH:  Depression: 4   Anxiety: 4   SI denies suicidal ideation   Risk of Suicide: No Risk  HI Negative for homicidal ideation      AVH:no If Hallucinations are present, describe? GENERAL:  Appetite: good   Percent Meals: 75%   Social: Yes   Speech: normal   Appearance: appropriately dressed and healthy looking    GROUP:  Group Participation: Yes  Participation Quality: Active Listener    Notes: Patient alert and oriented times 4.  Patient rates depression 4/10, anxiety 4/10, denies SI, HI, and NAZ.  Patient has remained calm most of the day and staying out of his room watching television. Patient became tearful when discussing his family and they are apart now. He states \"I am sad about my family\" and became tearful and walked away. Will continue to monitor.          Electronically signed by Asa Mcdonough RN on 12/21/22 at 2:13 PM CST

## 2022-12-21 NOTE — PROGRESS NOTES
Group Note    Date: 12/21/22  Start Time: 8:00 AM   End Time:8:30 AM     Number of Participants: 12    Type of Group: Community/Goal     Patient's Goal:  \"Exercise\"    Notes:      Status After Intervention:      Participation Level:  Active Listener    Participation Quality: Appropriate    Speech:  normal    Thought Process/Content: Logical    Mood:  Calm    Level of consciousness:  Alert    Response to Learning: Able to verbalize current knowledge/experience    Modes of Intervention: Education and Support    Discipline Responsible: Behavioral Health Technician     Signature:  Yesi Gonzalez

## 2022-12-21 NOTE — PLAN OF CARE
Problem: Coping  Goal: Pt/Family able to verbalize concerns and demonstrate effective coping strategies  Description: INTERVENTIONS:  1. Assist patient/family to identify coping skills, available support systems and cultural and spiritual values  2. Provide emotional support, including active listening and acknowledgement of concerns of patient and caregivers  3. Reduce environmental stimuli, as able  4. Instruct patient/family in relaxation techniques, as appropriate  5. Assess for spiritual pain/suffering and initiate Spiritual Care, Psychosocial Clinical Specialist consults as needed  Outcome: Progressing  Note:                                                                     Group Therapy Note    Date: 12/21/2022  Start Time: 1000  End Time:  1030  Number of Participants: 6    Type of Group: Psychoeducation    Wellness Binder Information  Module Name:  [address] [place] [address]  Session Number:  1    Group Goal for Pt: To improve knowledge of practical facts about depression    Notes:  Pt demonstrated improved knowledge of practical facts about depression by actively participating in group activity. Status After Intervention:  Unchanged    Participation Level:  Active Listener and Interactive    Participation Quality: Appropriate and Attentive      Speech:  normal      Thought Process/Content: Logical      Affective Functioning: Congruent      Mood: anxious and depressed      Level of consciousness:  Alert and Oriented x4      Response to Learning: Able to verbalize current knowledge/experience, Able to verbalize/acknowledge new learning, and Progressing to goal      Endings: None Reported    Modes of Intervention: Education      Discipline Responsible: Psychoeducational Specialist      Signature:  Destiny Bennett

## 2022-12-21 NOTE — PROGRESS NOTES
[facility] Adult Unit Daily Assessment  Nursing Progress Note    Room: [facility]-01   Name: Danika Nowak   Age: 30 y.o. Gender: male   Dx: Psychosis, unspecified psychosis type ([address]  Precautions: suicide risk and elopement  Inpatient Status: involuntary       SLEEP:  Sleep Quality Good  Sleep Medications: Yes   PRN Sleep Meds: No       MEDICAL:  Other PRN Meds: Yes   Med Compliant: Yes  Accu-Chek: No  Oxygen/CPAP/BiPAP: No  CIWA/CINA: No   PAIN Assessment: present - adequately treated  Side Effects from medication: No      Metabolic Screening:  No results found for: LABA1C  No results found for: CHOL  No results found for: TRIG  No results found for: HDL  No components found for: LDLCAL  No results found for: LABVLDL  Body mass index is 24.19 kg/m². BP Readings from Last 2 Encounters:   12/20/22 (!) 140/88         Medical Bed:   Is patient in a medical bed? no   If medical bed is in use, has nursing secured room while patient is awake and out of the room? NA  Has safety checks by nursing been completed on the bed/room this shift? NA    Protective Factors:  Patient identifies protective factors with nursing staff as follows: Identifies reasons for living: Yes   Supportive Social Network or family: Yes    Belief that suicide is immoral/high spirituality: Yes   Responsibility to family or others/living with family: Yes   Fear of death or dying due to pain and suffering: Yes   Engaged in work or school: Yes     If Patient is unable to identify, reason why? PSYCH:  Depression: 0   Anxiety: 0   SI denies suicidal ideation   Risk of Suicide: No Risk  HI Negative for homicidal ideation      AVH:no If Hallucinations are present, describe? GENERAL:  Appetite: good   Percent Meals:    Social: Yes   Speech: normal   Appearance: appropriately dressed and healthy looking    GROUP:  Group Participation: Yes  Participation Quality: Interactive    Notes:   Alert and oriented x4.  Patient was tearful, flat effect, withdrawn, anxious,  and low concentration. Patient stated that he was still worried about his wife and son. Patient reported that he had attended groups and was helpful. He was trying to improve his positive thinking and coping skills. Continue to monitor 1 to 1 for safety.        Electronically signed by Jeff Hernandez RN on 12/21/22 at 4:51 AM CST

## 2022-12-21 NOTE — PROGRESS NOTES
Group Therapy Note    Date:   Start Time: 1600  End Time:  1630  Number of Participants: 6    Type of Group: Healthy Living/Wellness    Wellness Binder Information  Module Name:      Patient's Goal:      Notes:      Status After Intervention:      Participation Level:     Participation Quality:       Speech:         Thought Process/Content:       Affective Functioning:       Mood:       Level of consciousness:        Response to Learning:       Endings:     Modes of Intervention: Education and Support      Discipline Responsible: Registered Nurse      Signature:  Jf Miner RN

## 2022-12-21 NOTE — PLAN OF CARE
Problem: Self Harm/Suicidality  Goal: Will have no self-injury during hospital stay  Description: INTERVENTIONS:  1. Ensure constant observer at bedside with Q15M safety checks  2. Maintain a safe environment  3. Secure patient belongings  4. Ensure family/visitors adhere to safety recommendations  5. Ensure safety tray has been added to patient's diet order  6.   Every shift and PRN: Re-assess suicidal risk via Frequent Screener    Outcome: Progressing  Flowsheets (Taken 12/21/2022 1336)  Will have no self-injury during hospital stay:   Maintain a safe environment   Ensure constant observer at bedside with Q15M safety checks

## 2022-12-21 NOTE — PROGRESS NOTES
Group Note    Date: 12/21/22  Start Time: 8:00 PM   End Time:8:30 PM     Number of Participants: 8    Type of Group: Wrap-Up     Patient's Goal:      Notes:      Status After Intervention:  Unchanged    Participation Level:  Active Listener    Participation Quality: Sharing    Speech:  normal    Thought Process/Content: Logical    Mood:  appropriate for group    Level of consciousness:  Alert    Response to Learning: Progressing to goal    Modes of Intervention: Education and Support    Discipline Responsible: Behavioral Health Technician     Signature:  Jared Tom

## 2022-12-21 NOTE — PLAN OF CARE
Problem: Coping  Goal: Pt/Family able to verbalize concerns and demonstrate effective coping strategies  12/21/2022 1138 by Wilbur Batista  Outcome: Progressing    Group Therapy Note     Date: 12/21/2022  Start Time: 1100  End Time:  6006  Number of Participants: 7     Type of Group: Psychotherapy     Wellness Binder Information  Module Name:  staying well  Session Number:  1     Patient's Goal:  daily maintenance and coping skills     Notes:  pt acknowledged use of positive coping skills daily to help stay well.      Status After Intervention:  Improved     Participation Level: Interactive     Participation Quality: Appropriate, Attentive, and Sharing        Speech:  normal        Thought Process/Content: Logical        Affective Functioning: Congruent        Mood: congruent        Level of consciousness:  Alert, Oriented x4, and Attentive        Response to Learning: Able to verbalize/acknowledge new learning        Endings: None Reported     Modes of Intervention: Education        Discipline Responsible: Psychoeducational Specialist        Signature:  Wilbur Batista

## 2022-12-21 NOTE — PROGRESS NOTES
Received a phone call from Rula Salvador at [address] [place] [address] requesting to have patient's medical information when he is discharged for follow up. The patient gave Rula Salvador his patient code number explained to her that staff was unable to release any information about him. She did request to talk to  so call was directed to [employer]

## 2022-12-22 VITALS
SYSTOLIC BLOOD PRESSURE: 135 MMHG | WEIGHT: 193.5 LBS | TEMPERATURE: 96.8 F | RESPIRATION RATE: 20 BRPM | HEART RATE: 111 BPM | OXYGEN SATURATION: 99 % | BODY MASS INDEX: 24.06 KG/M2 | HEIGHT: 75 IN | DIASTOLIC BLOOD PRESSURE: 75 MMHG

## 2022-12-22 PROCEDURE — 6370000000 HC RX 637 (ALT 250 FOR IP): Performed by: PSYCHIATRY & NEUROLOGY

## 2022-12-22 PROCEDURE — 5130000000 HC BRIDGE APPOINTMENT

## 2022-12-22 PROCEDURE — 6370000000 HC RX 637 (ALT 250 FOR IP): Performed by: FAMILY MEDICINE

## 2022-12-22 RX ORDER — RISPERIDONE 2 MG/1
2 TABLET ORAL NIGHTLY
Qty: 30 TABLET | Refills: 0 | Status: SHIPPED | OUTPATIENT
Start: 2022-12-22

## 2022-12-22 RX ORDER — HYDROXYZINE HYDROCHLORIDE 25 MG/1
25 TABLET, FILM COATED ORAL 3 TIMES DAILY PRN
Qty: 30 TABLET | Refills: 0 | Status: SHIPPED | OUTPATIENT
Start: 2022-12-22 | End: 2023-01-01

## 2022-12-22 RX ADMIN — CYANOCOBALAMIN TAB 500 MCG 500 MCG: 500 TAB at 07:40

## 2022-12-22 RX ADMIN — HYDROXYZINE HYDROCHLORIDE 25 MG: 25 TABLET, FILM COATED ORAL at 07:40

## 2022-12-22 NOTE — PROGRESS NOTES
Behavioral Health   Discharge Note  Bridge Appointment completed: Reviewed Discharge Instructions with patient. Patient verbalizes understanding and agreement with the discharge plan using the teachback method. Referral for Outpatient Tobacco Cessation Counseling, upon discharge (jem X if applicable and completed):    ( )  Hospital staff assisted patient to call Quit Line or faxed referral                                   during hospitalization                  ( )  Recognizing danger situations (included triggers and roadblocks), if not completed on admission                    ( )  Coping skills (new ways to manage stress, exercise, relaxation techniques, changing routine, distraction), if not completed on admission                                                           ( )  Basic information about quitting (benefits of quitting, techniques in how to quit, available resources, if not completed on admission  ( ) Referral for counseling faxed to [facility]   ( ) Patient refused referral  ( ) Patient refused counseling  ( x) Patient refused smoking cessation medication upon discharge    Vaccinations (jem X if applicable and completed):  ( ) Patient states already received influenza vaccine elsewhere  ( ) Patient received influenza vaccine during this hospitalization  ( x) Patient refused influenza vaccine at this time      Pt discharged with followings belongings:   Dental Appliances: None  Vision - Corrective Lenses: None  Hearing Aid: None  Jewelry: Other (Comment) (see belongings document)  Body Piercings Removed: N/A  Clothing: Other (Comment) (see belongings document)  Other Valuables: Other (Comment) (see belongings document)   Valuables sent home with patient. Valuables retrieved from safe and returned to patient. Patient left department with [facility] and [facility] police   via wheelchair  , discharged to police department  .  Patient education on aftercare instructions: yes Patient verbalize understanding of AVS:  yes. Suicidal Ideations? No Risk of Suicide: No Risk AVH? Denies  HI? Negative for homicidal ideation       Status EXAM upon discharge:  Mental Status and Behavioral Exam  Normal: Yes  Level of Assistance: Independent/Self  Facial Expression: Flat  Affect: Congruent  Level of Consciousness: Alert  Frequency of Checks: 4 times per hour, close  Mood:Normal: No  Mood: Anxious  Motor Activity:Normal: Yes  Motor Activity: Increased  Eye Contact: Good  Observed Behavior: Friendly, Cooperative  Sexual Misconduct History: Current - no  Preception: [place] to person, [place] to time, [place] to place, [place] to situation  Attention:Normal: Yes  Attention: Unable to concentrate  Thought Processes: Circumstantial  Thought Content:Normal: Yes  Thought Content: Preoccupations  Depression Symptoms: No problems reported or observed. Anxiety Symptoms: Generalized  Jennie Symptoms: No problems reported or observed. Hallucinations: None  Delusions: No  Delusions: Controlled, Obsessions  Memory:Normal: Yes  Memory: Poor remote  Insight and Judgment: No  Insight and Judgment: Poor judgment    AVS/Transition Record has been discussed with patient and a copy was given to the patient. The AVS/Transition Record was faxed to the next level of care today.

## 2022-12-22 NOTE — PROGRESS NOTES
Progress Note  Lucy Ngo  12/22/2022 4:33 PM  Subjective:   Admit Date:   12/17/2022      CC/ADMIT DX:       Interval History:   Reviewed overnight events and nursing notes. He denies any new medical issues. I have reviewed all labs/diagnostics from the last 24hrs. ROS:   I have done a 10 point ROS and all are negative, except what is mentioned in the HPI. No diet orders on file    Medications:   REM  REM          Objective:   Vitals: /75   Pulse (!) 111   Temp 96.8 °F (36 °C) (Temporal)   Resp 20   Ht 6' 3\" (1.905 m)   Wt 193 lb 8 oz (87.8 kg)   SpO2 99%   BMI 24.19 kg/m²  No intake or output data in the 24 hours ending 12/22/22 1633  General appearance: alert and cooperative with exam  Extremities: extremities normal, atraumatic, no cyanosis or edema  Neurologic:  No obvious focal neurologic deficits. Skin: no rashes    Assessment and Plan:   Principal Problem:    Psychosis, unspecified psychosis type ([address]  Active Problems:    Hallucinations    Methamphetamine use disorder, severe, in early remission ([address]    Cannabis use disorder, moderate, in early remission ([address]    Alcohol use disorder, severe, in early remission ([address]  Resolved Problems:    * No resolved hospital problems. *    Elevated BP    Plan:   Continue present medication(s)    Monitor BP   Follow with Psych      Discharge planning:   home     Reviewed treatment plans with the patient and/or family.              Electronically signed by Francy Benítez MD on 12/22/2022 at 4:33 PM

## 2022-12-22 NOTE — DISCHARGE SUMMARY
Discharge Summary     Patient ID:  Delmis Wagoner  [id number]  22 y.o.  1985    Admit date: 12/17/2022  Discharge date: 12/22/2022    Admitting Physician: Gordon Lyons MD   Attending Physician: Gordon Lyons MD  Discharge Provider: EUGENIA Villatoro     Admission Diagnoses: Hallucinations [R44.3]  Psychosis, unspecified psychosis type [facility]) [F29]    Discharge Diagnoses:   Substance induced psychosis  Methamphetamine use disorder, severe  Opioids (kratom) use disorder, severe  Cannabis use disorder, severe  Alcohol use disorder, severe  Tobacco use disorder, severe  Rule out drug-induced psychosis  Insomnia    Admission Condition: fair    Discharged Condition: good    Indication for Admission: psychosis      HPI: Per attending       The patient is a 30 y.o. male with no reported previous psychiatric history, who has been admitted to our psychiatric unit secondary to visual hallucinations. Patient has been seen in treatment team room. He reported that during the last 1-1/2-week he sees ghosts \"only when I go inside of my house\". He reported long history of abusing alcohol, using methamphetamine, opioids and marijuana. Patient reported that he went to rehab in September - October 2022 and since that time he significantly decreased amount of drinking alcohol and using methamphetamine. He reported that last time when he used opioids (kratom) is in September 2022 and last time he smoked marijuana 1 year ago. (For additional information, see patient's social history). Patient reported that experiencing visual hallucinations makes him feel stressed and he decided to come to the hospital and ask for help. Today during the interview, he denies feeling of depression or anxiety, endorses decreased quality of sleep, low energy level, feeling of fatigue, tiredness, and general muscle weakness. Patient denies any mood swings or racing thoughts.   He denies feeling of hopelessness, helplessness and worthlessness. Patient denies current active suicidal or homicidal ideations, denies any plans. Also, he denies auditory and visual hallucinations today, stated that last time when he experienced visual hallucinations is yesterday in the morning. He did not endorse any delusions or paranoid thoughts. Hospital Course:   Patient was admitted to the adult behavioral health floor and was acclimated to the floor. Labs were reviewed and physical exam was completed by Dr. Pedro Guevara and associates. Home medications were reconciled. JUMA was obtained and reviewed. Medication changes were made and patient tolerated well with no side effects. Risperdal was initiated for psychosis, Trazodone for insomnia and Hydroxyzine for anxiety. His vitamin b-12 was replaced as well related to vitamin b-12 insufficiency. He was behaviorally stable during the entire psychiatric hospitalization. As medications were administered and hospitalization progressed his psychosis cleared. He stated \" I am feeling much better now. \" Patient attended and participated in groups. He reported that group therapy was helpful to him. He was future oriented and was looking forward to his mother assisting him with inpatient chemical dependency treatment. Patient was calm and cooperative with staff and peers. Patient was compliant with his medications. Patient was sleeping through the night. This patient is not suicidal, homicidal or psychotic at discharge. He does not present a danger to self or others. On the day of discharge and transfer of care, patient indicated readiness for discharge. On 12- patient had reached maximum benefit from the inpatient hospitalization and was discharged into the custody of the [facility] police regarding a violation of an EPO. During the hospitalization [facility] office sent  proof of an existing warrant on the patient.    He was not acutely manic nor agitated with no reported symptoms of psychosis. He indicated no thoughts of self-harm or harm to others. Given resolution of presenting symptoms and patient readiness for discharge and that patient agreed to follow-up with outpatient services with [facility] and the patient was discharged with supply of medication. He denied access to firearms or weapons. He was advised to abstain from all drugs and alcohol and to remain adherent to medication as prescribed.        Number of antipsychotic medication prescribed at discharge: 1- Risperdal      Referral to addiction treatment: pt declined    Prescription for Alcohol or Drug Disorder Medication: no fda approved medication for Kratom and Methamphetamine     Prescription for Tobacco Cessation medication: pt declined    If no prescriptions for Tobacco Cessation must document why: pt declined    Consults: internal medicine    Significant Diagnostic Studies: labs:     Lab Results   Component Value Date    WBC 7.3 12/17/2022    HGB 14.5 12/17/2022    HCT 44.0 12/17/2022    MCV 96.9 (H) 12/17/2022     12/17/2022     Lab Results   Component Value Date/Time     12/17/2022 11:52 PM    K 4.3 12/17/2022 11:52 PM     12/17/2022 11:52 PM    CO2 25 12/17/2022 11:52 PM    BUN 12 12/17/2022 11:52 PM    CREATININE 1.1 12/17/2022 11:52 PM    GLUCOSE 104 12/17/2022 11:52 PM    CALCIUM 9.4 12/17/2022 11:52 PM      Lab Results   Component Value Date    TSHFT4 1.41 12/20/2022     Lab Results   Component Value Date/Time    VITD25 38.3 12/20/2022 06:03 AM        Latest Reference Range & Units 12/20/22 06:03   Vitamin B-12 211 - 946 pg/mL 310      Latest Reference Range & Units 12/18/22 00:34   Amphetamine Screen, Urine Negative <500 ng/mL  Negative   Barbiturate Screen, Ur Negative < 200 ng/mL  Negative   Benzodiazepine Screen, Urine Negative <150 ng/mL  Negative   Buprenorphine Urine Negative <10 ng/mL  Negative   Cannabinoid Scrn, Ur Negative <50 ng/mL  Negative   METHADONE SCREEN, URINE, 48532 Negative <200 ng/mL  Negative   METHAMPHETAMINE,URINE Negative <500 ng/mL  Negative   Opiate Scrn, Ur Negative < 100 ng/mL  Negative   PCP Screen, Urine Negative <25 ng/mL  Negative   Propoxyphene Scrn, Ur Negative <300 ng/mL  Negative   Tricyclic Negative <025 ng/mL  Negative   Cocaine Metabolite Screen, Urine Negative <150 ng/mL  Negative   Oxycodone Urine Negative <100 ng/mL  Negative      Latest Reference Range & Units 12/17/22 23:52   Albumin 3.5 - 5.2 g/dL 4.5   Alk Phos 40 - 130 U/L 77   ALT 5 - 41 U/L 13   AST 5 - 40 U/L 18   Bilirubin 0.2 - 1.2 mg/dL 0.4   Total Protein 6.6 - 8.7 g/dL 7.2         Treatments: therapies: RN and SW    Alert, Oriented X 4  Appearance:  Grooming and Hygiene attended to  Speech with Regular Rate and Rhythm  Eye Contact:  Good  No Psychomotor Agitation/Retardation Noted  Attitude:  Cooperative  Mood:  \"I am feeling a lot better now, ready to get my life back on the right track. \"  Affective: Congruent, appropriate to the situation, with a normal range and intensity  Thought Processes:  Coherently communicated, logical and goal oriented  Thought Content:  At this time No Suicidal Ideation, No Homicidal Ideation, No Auditory or Visual Hallucinations, No Overt Delusions  Insight:  Present  Judgement:  Normal  Memory is intact for both remote and recent  Intellectual Functioning:  Within the [address] of Knowledge:  Adequate  Attention and Concentration:  Adequate        Discharge Exam:  GAIT STABLE  SPEAKS IN FULL SENTENCES WITHOUT SHORTNESS OF AIR    Disposition: custody of the [place], [place] Police     Patient Instructions:   Current Discharge Medication List        START taking these medications    Details   hydrOXYzine HCl (ATARAX) 25 MG tablet Take 1 tablet by mouth 3 times daily as needed for Anxiety  Qty: 30 tablet, Refills: 0      vitamin B-12 500 MCG tablet Take 1 tablet by mouth daily  Qty: 30 tablet, Refills: 0      risperiDONE (RISPERDAL) 2 MG tablet Take 1 tablet by mouth nightly  Qty: 30 tablet, Refills: 0           Activity: activity as tolerated  Diet: regular diet  Wound Care: none needed    Follow-up with     PCP in 2 weeks.     Gigi in one week     Time worked: Less than 30 minutes    Participation:good    Electronically signed by PABLO WattP-BC, FNP-C on 12/22/2022 at 9:26 AM

## 2022-12-22 NOTE — PROGRESS NOTES
[facility] Adult Unit Daily Assessment  Nursing Progress Note    Room: [facility]603-01   Name: Jenniffer Schaefer   Age: 30 y.o. Gender: male   Dx: Psychosis, unspecified psychosis type ([address]  Precautions: close watch and suicide risk  Inpatient Status: involuntary       SLEEP:  Sleep Quality Good  Sleep Medications: Yes   PRN Sleep Meds: No       MEDICAL:  Other PRN Meds: No   Med Compliant: Yes  Accu-Chek: No  Oxygen/CPAP/BiPAP: No  CIWA/CINA: No   PAIN Assessment: none  Side Effects from medication: No      Metabolic Screening:  No results found for: LABA1C  No results found for: CHOL  No results found for: TRIG  No results found for: HDL  No components found for: LDLCAL  No results found for: LABVLDL  Body mass index is 24.19 kg/m². BP Readings from Last 2 Encounters:   12/21/22 133/87         Medical Bed:   Is patient in a medical bed? no   If medical bed is in use, has nursing secured room while patient is awake and out of the room? NA  Has safety checks by nursing been completed on the bed/room this shift? NA    Protective Factors:  Patient identifies protective factors with nursing staff as follows: Identifies reasons for living: Yes   Supportive Social Network or family: Yes    Belief that suicide is immoral/high spirituality: Yes   Responsibility to family or others/living with family: Yes   Fear of death or dying due to pain and suffering: Yes   Engaged in work or school: Yes     If Patient is unable to identify, reason why? N/A      PSYCH:  Depression: 0   Anxiety: 0   SI denies suicidal ideation   Risk of Suicide: No Risk  HI Negative for homicidal ideation      AVH:no If Hallucinations are present, describe?          GENERAL:  Appetite: good   Percent Meals: 75%   Social: Yes   Speech: normal   Appearance: appropriately dressed, appropriately groomed, and healthy looking    GROUP:  Group Participation: Yes  Participation Quality: Interactive    Notes:   Patient up in the day area watching television with peers. Patient reports he will be discharging to [facility] in the morning and is anxious to get on the road in the morning. He is compliant with medication and has completed group wrap up tonight. He currently denies SI, HI, and AVH.       Electronically signed by Belinda Gregorio LPN on 54/68/86 at 86:47 PM CST

## 2022-12-22 NOTE — PROGRESS NOTES
Progress Note  Jessica Begin  12/21/2022 11:19 PM  Subjective:   Admit Date:   12/17/2022      CC/ADMIT DX:       Interval History:   Reviewed overnight events and nursing notes. He has no new medical issues. I have reviewed all labs/diagnostics from the last 24hrs. ROS:   I have done a 10 point ROS and all are negative, except what is mentioned in the HPI. ADULT DIET; Regular    Medications:      vitamin B-12  500 mcg Oral Daily    risperiDONE  2 mg Oral Nightly    traZODone  50 mg Oral Nightly    melatonin  5 mg Oral Nightly           Objective:   Vitals: /87   Pulse (!) 104   Temp 97.9 °F (36.6 °C) (Temporal)   Resp 16   Ht 6' 3\" (1.905 m)   Wt 193 lb 8 oz (87.8 kg)   SpO2 99%   BMI 24.19 kg/m²  No intake or output data in the 24 hours ending 12/21/22 4534  General appearance: alert and cooperative with exam  Extremities: extremities normal, atraumatic, no cyanosis or edema  Neurologic:  No obvious focal neurologic deficits. Skin: no rashes    Assessment and Plan:   Principal Problem:    Psychosis, unspecified psychosis type ([address]  Active Problems:    Hallucinations    Methamphetamine use disorder, severe, in early remission ([address]    Cannabis use disorder, moderate, in early remission ([address]    Alcohol use disorder, severe, in early remission ([address]  Resolved Problems:    * No resolved hospital problems. *    Elevated BP    Plan:   Continue present medication(s)    Follow with BP   Follow with Psych      Discharge planning:   home     Reviewed treatment plans with the patient and/or family.              Electronically signed by Zeinab Purdy MD on 12/21/2022 at 11:19 PM

## 2022-12-22 NOTE — DISCHARGE INSTRUCTIONS
Medications:   Medication Summary Provided. I understand that I should take only the medications on my list.   --why and when I need to take each medication. --which side effects to watch for.   --that I should carry my medication information at all times in case of emergency situations. --I will take all medications until discontinued by physician. I will take all my medications to follow up appointments. --check with my physician or pharmacist before taking any new medication, over the counter product or drink alcohol.   --ask about food, drug and dietary supplement interactions. --discard old lists and update records with medication providers. Behavior Health Follow Up:  Original Referral Source: ED  Discharge Diagnosis: [unfilled]  Recommendations for Level of Care: Follow Up  Patient Status at Discharge: Stable  [facility]  was: Aftercare plan faxed:    Faxed by: Social Work staff   Date: 22   Time: ***  Prescriptions sent with pt.     General Information:   Questions regarding your bill: Call [phone]   Suicide Hotline (Rescue Crisis) 3-319.705.5920   To obtain results of pending studies call Medical Records at: [phone]   For emergencies call: 911 24 hour/7 days a week contact information: [phone]

## 2022-12-22 NOTE — PROGRESS NOTES
Treatment Team Note:    Target Symptoms/Reason for admission: Per nursing admission assessment - Reason for Admission: Darrold Cockayne is a 30 y.o. male who presents to the emergency department for a mental health evaluation. Patient has been seeing ghosts for 2 weeks. Says that sometimes he will see something on the corner of his eyes like a dark shadow. Sometimes he sees stuff on the bed that makes the sheets move. No active hallucinations at this time. Felt like a ghost bit him on his right ear and right arm earlier. He has no signs of trauma. No HI or SI. He has no complaints at this time. Family did say that his wife recently had an EPO put out against him because of strange behavior. Family said that wife told him that she has been tracking him with his phone and that he has not really slept in the last 4 days and has been going to multiple gas stations. She is concerned that he may be using kratom. Family says that wife states that she was with him when he bought it but family thinks she may be unreliable. Does have a history of meth use in October of this year. Also has a history of alcohol use. Patient denies any meth use since October. Denies any ingestion or drug use of any kind. Patient has no inpatient or outpatient psychiatric treatment and no history of suicide attempts. Diagnoses per psych provider: Hallucinations [R44.3]  Psychosis, unspecified psychosis type [facility]) [F29]    Therapist met with treatment team to discuss patients treatment and discharge plans. Patient's aftercare plan is: Pt has outstanding warrant for his arrest, law enforcement will be contacted.     Aftercare appointments made: NO    Pt lives with: mother    Collateral obtained from:  Tuan  Collateral obtained on:12/19/22    Attending groups: Yes    Behavior: cooperative, more social with peers/staff    Has patient been completing ADL's:  Yes    SI:  patient denies SI  Plan: no   If yes describe: N/A - patient denies plan  HI:  patient denies HI  If present describe: N/A  Delusions: patient denies delusions  If present describe: N/A  Hallucinations: patient denies hallucinations  If present describe: N/A    Patient rates their -- Depression: 1-10:  0  Anxiety:1-10:  0    Sleeping:Yes    Taking medication: Yes    Misc: Pt will be discharged today.

## 2022-12-22 NOTE — PLAN OF CARE
medication as ordered  2. Educate regarding involuntary admission procedures and rules  3. Contain excessive/inappropriate behavior per unit and hospital policies  Outcome: Completed     Problem: Confusion  Goal: Confusion, delirium, dementia, or psychosis is improved or at baseline  Description: INTERVENTIONS:  1. Assess for possible contributors to thought disturbance, including medications, impaired vision or hearing, underlying metabolic abnormalities, dehydration, psychiatric diagnoses, and notify attending LIP  2. Dustin high risk fall precautions, as indicated  3. Provide frequent short contacts to provide reality reorientation, refocusing and direction  4. Decrease environmental stimuli, including noise as appropriate  5. Monitor and intervene to maintain adequate nutrition, hydration, elimination, sleep and activity  6. If unable to ensure safety without constant attention obtain sitter and review sitter guidelines with assigned personnel  7. Initiate Psychosocial CNS and Spiritual Care consult, as indicated  Outcome: Completed     Problem: Depression/Self Harm  Goal: Effect of psychiatric condition will be minimized and patient will be protected from self harm  Description: INTERVENTIONS:  1. Assess impact of patient's symptoms on level of functioning, self care needs and offer support as indicated  2. Assess patient/family knowledge of depression, impact on illness and need for teaching  3. Provide emotional support, presence and reassurance  4. Assess for possible suicidal thoughts or ideation. If patient expresses suicidal thoughts or statements do not leave alone, initiate Suicide Precautions, move to a room close to the nursing station and obtain sitter  5.  Initiate consults as appropriate with Mental Health Professional, Spiritual Care, Psychosocial CNS, and consider a recommendation to the LIP for a Psychiatric Consultation  Outcome: Completed     Problem: Coping  Goal: Pt/Family able to verbalize concerns and demonstrate effective coping strategies  Description: INTERVENTIONS:  1. Assist patient/family to identify coping skills, available support systems and cultural and spiritual values  2. Provide emotional support, including active listening and acknowledgement of concerns of patient and caregivers  3. Reduce environmental stimuli, as able  4. Instruct patient/family in relaxation techniques, as appropriate  5.  Assess for spiritual pain/suffering and initiate Spiritual Care, Psychosocial Clinical Specialist consults as needed  Outcome: Completed     Problem: Risk for Elopement  Goal: Patient will not exit the unit/facility without proper excort  Outcome: Completed     Problem: Pain  Goal: Verbalizes/displays adequate comfort level or baseline comfort level  Outcome: Completed

## 2022-12-22 NOTE — PLAN OF CARE
Group Therapy Note    Date: 12/22/2022  Start Time: 1000  End Time:  1030  Number of Participants: 7    Type of Group: Psychoeducation    Wellness Binder Information  Module Name:  Relapse Prevention  Session Number:  5    Group Goal for Pt: To improve knowledge of relapse prevention strategies    Notes:  Pt did not attend group discussion. Pt was invited/encouraged. Status After Intervention:      Participation Level:     Participation Quality:       Speech:         Thought Process/Content:       Affective Functioning:       Mood:       Level of consciousness:        Response to Learning:       Endings:     Modes of Intervention:       Discipline Responsible:       Signature:  Donna Conteh

## 2022-12-22 NOTE — PROGRESS NOTES
Discharge Note     Patient is discharging on this date. Patient denies SI, HI, and AVH at this time. Patient reports improvement in behavior and is leaving unit in overall good condition. SW and patient discussed patient's follow up appointments and importance of attending appointments as scheduled, patient voiced understanding and agreement. Patient and SW also discussed patient's safety plan and patient was able to verbally identify: warning signs, coping strategies, places and people that help make the patient feel better/distract negative thoughts, friends/family/agencies/professionals the patient can reach out to in a crisis, and something that is important to the patient/worth living for. Patient was provided the national suicide prevention hotline number (7-267-022-034-571-4482) as well as local community behavioral health [facility]) crisis number for emergencies ([phone]).      Discharge Disposition: home -lives with family      Pt to follow up with:  Patient will follow up with Josiane Cruz  67.    Referral to outpatient tobacco cessation counseling treatment:  Patient refused referral to outpatient tobacco cessation counseling    SW offered to assist patient with transportation, patient declined transportation assistance

## 2022-12-22 NOTE — DISCHARGE INSTR - DIET

## 2022-12-22 NOTE — PROGRESS NOTES
[facility] Adult Unit Daily Assessment  Nursing Progress Note    Room: [facility]603-01   Name: Lucy Ngo   Age: 30 y.o. Gender: male   Dx: Psychosis, unspecified psychosis type ([address]  Precautions: suicide risk patient denies at this time   Inpatient Status: involuntary       SLEEP:  Sleep Quality Good  Sleep Medications: Yes   PRN Sleep Meds: Yes       MEDICAL:  Other PRN Meds: Yes   Med Compliant: Yes  Accu-Chek: No  Oxygen/CPAP/BiPAP: No  CIWA/CINA: No   PAIN Assessment: none  Side Effects from medication: No      Metabolic Screening:  No results found for: LABA1C  No results found for: CHOL  No results found for: TRIG  No results found for: HDL  No components found for: LDLCAL  No results found for: LABVLDL  Body mass index is 24.19 kg/m². BP Readings from Last 2 Encounters:   12/22/22 135/75         Medical Bed:   Is patient in a medical bed? no   If medical bed is in use, has nursing secured room while patient is awake and out of the room? no  Has safety checks by nursing been completed on the bed/room this shift? no    Protective Factors:  Patient identifies protective factors with nursing staff as follows: Identifies reasons for living: Yes   Supportive Social Network or family: Yes    Belief that suicide is immoral/high spirituality: Yes   Responsibility to family or others/living with family: Yes   Fear of death or dying due to pain and suffering: Yes   Engaged in work or school: No     If Patient is unable to identify, reason why? PSYCH:  Depression: 0   Anxiety: 0   SI denies suicidal ideation   Risk of Suicide: No Risk  HI Negative for homicidal ideation      AVH:no If Hallucinations are present, describe? GENERAL:  Appetite: good   Percent Meals: 100%   Social: Yes   Speech: normal   Appearance: appropriately dressed and healthy looking    GROUP:  Group Participation: Yes  Participation Quality: Active Listener    Notes:   Patient alert and oriented times 4.   Patient rated depression 0/10, anxiety 0/10, denies SI, HI, and AVH. Patient reports he is feeling better and is wanting to go to rehabilitation. Patient is brighter today interact with peers and watching television. Will continue to monitor.        Electronically signed by Edison Kayser, RN on 12/22/22 at 11:05 AM CST

## 2023-08-18 ENCOUNTER — OFFICE VISIT (OUTPATIENT)
Dept: PRIMARY CARE CLINIC | Age: 38
End: 2023-08-18
Payer: COMMERCIAL

## 2023-08-18 VITALS
WEIGHT: 205.4 LBS | RESPIRATION RATE: 16 BRPM | HEART RATE: 92 BPM | TEMPERATURE: 98.2 F | BODY MASS INDEX: 25.54 KG/M2 | SYSTOLIC BLOOD PRESSURE: 120 MMHG | DIASTOLIC BLOOD PRESSURE: 70 MMHG | HEIGHT: 75 IN | OXYGEN SATURATION: 96 %

## 2023-08-18 DIAGNOSIS — F33.1 MODERATE EPISODE OF RECURRENT MAJOR DEPRESSIVE DISORDER (HCC): Primary | ICD-10-CM

## 2023-08-18 DIAGNOSIS — F19.21 DRUG ADDICTION IN REMISSION (HCC): ICD-10-CM

## 2023-08-18 PROCEDURE — 99203 OFFICE O/P NEW LOW 30 MIN: CPT | Performed by: NURSE PRACTITIONER

## 2023-08-18 RX ORDER — ATOMOXETINE 40 MG/1
CAPSULE ORAL
COMMUNITY
Start: 2023-06-20 | End: 2023-08-18 | Stop reason: ALTCHOICE

## 2023-08-18 RX ORDER — BUPROPION HYDROCHLORIDE 100 MG/1
100 TABLET ORAL EVERY MORNING
COMMUNITY
End: 2023-08-18 | Stop reason: SDUPTHER

## 2023-08-18 RX ORDER — BUPROPION HYDROCHLORIDE 100 MG/1
100 TABLET ORAL EVERY MORNING
Qty: 90 TABLET | Refills: 1 | Status: SHIPPED | OUTPATIENT
Start: 2023-08-18

## 2023-08-18 RX ORDER — QUETIAPINE FUMARATE 100 MG/1
200 TABLET, FILM COATED ORAL NIGHTLY
COMMUNITY
Start: 2023-07-29

## 2023-08-18 RX ORDER — BUPROPION HYDROCHLORIDE 300 MG/1
300 TABLET ORAL EVERY MORNING
Qty: 90 TABLET | Refills: 1 | Status: SHIPPED | OUTPATIENT
Start: 2023-08-18

## 2023-08-18 RX ORDER — BUPRENORPHINE AND NALOXONE 8; 2 MG/1; MG/1
1 FILM, SOLUBLE BUCCAL; SUBLINGUAL DAILY
COMMUNITY

## 2023-08-18 RX ORDER — VALSARTAN 160 MG/1
TABLET ORAL
COMMUNITY
End: 2023-08-18 | Stop reason: ALTCHOICE

## 2023-08-18 RX ORDER — HYDROXYZINE HYDROCHLORIDE 25 MG/1
TABLET, FILM COATED ORAL
COMMUNITY
Start: 2023-08-09 | End: 2023-08-18 | Stop reason: ALTCHOICE

## 2023-08-18 RX ORDER — QUETIAPINE FUMARATE 200 MG/1
200 TABLET, FILM COATED ORAL
Qty: 90 TABLET | Refills: 1 | Status: SHIPPED | OUTPATIENT
Start: 2023-08-18

## 2023-08-18 RX ORDER — BUPROPION HYDROCHLORIDE 150 MG/1
300 TABLET ORAL EVERY MORNING
COMMUNITY
Start: 2023-06-29 | End: 2023-08-18 | Stop reason: DRUGHIGH

## 2023-08-18 ASSESSMENT — ENCOUNTER SYMPTOMS
ALLERGIC/IMMUNOLOGIC NEGATIVE: 1
GASTROINTESTINAL NEGATIVE: 1
EYES NEGATIVE: 1
RESPIRATORY NEGATIVE: 1

## 2023-08-18 NOTE — PROGRESS NOTES
refe  [address] [address]  Dept: [phone]  Dept Fax: [phone]  Loc: [phone]    Alanna Chaudhary is a 41 y.o. male who presents today for his medical conditions/complaints as noted below. Alanna Chaudhary is c/o of New Patient (Here for a check up and refill some meds he has been out of suboxone for 2 days can you refill that too)        HPI:     HPI   Chief Complaint   Patient presents with    New Patient     Here for a check up and refill some meds he has been out of suboxone for 2 days can you refill that too   He has been home a week living with his mother he had been in rehab in a different part of [place] for 60 days. I put him on Suboxone and he came home on it but only had a weeks worth. He has been out of it for couple of days. He admits that he did drink 2 beers 2 nights ago because he needed them for craving. He has not done any drugs. He really has done well with the Suboxone and thinks it will help him as long as he stays on it. He needs a referral to get that. He is on the Wellbutrin 300 mg extended release in the morning and 100 mg short acting. He is on Seroquel 200 mg at night for sleep and nightmares and that has worked for him. He does not have any siblings. He is in the process of divorce from his wife and has a 9year-old who he is going to get to have visitation with. Past Medical History:   Diagnosis Date    ADHD (attention deficit hyperactivity disorder)     Anxiety     History of alcohol abuse     History of drug abuse ([facility])       History reviewed. No pertinent surgical history.     Vitals 8/18/2023 8/18/2023 12/22/2022 12/21/2022 12/21/2022 09/06/8373   SYSTOLIC [id number] 562 - 885 -   DIASTOLIC 70 90 75 - 87 -   Pulse - 92 111 - 104 88   Temp - 98.2 96.8 - 97.9 -   Resp - 16 20 - 16 -   SpO2 - 96 99 - 99 -   Weight - 205 lb 6.4 oz - - - -   Height - 6' 3\" - - - -   Body Mass Index - 25.67 kg/m2 -